# Patient Record
Sex: MALE | Race: BLACK OR AFRICAN AMERICAN | NOT HISPANIC OR LATINO | Employment: OTHER | URBAN - METROPOLITAN AREA
[De-identification: names, ages, dates, MRNs, and addresses within clinical notes are randomized per-mention and may not be internally consistent; named-entity substitution may affect disease eponyms.]

---

## 2018-08-30 ENCOUNTER — APPOINTMENT (OUTPATIENT)
Dept: LAB | Facility: HOSPITAL | Age: 23
End: 2018-08-30
Attending: ORTHOPAEDIC SURGERY
Payer: MEDICAID

## 2018-08-30 DIAGNOSIS — R30.0 DYSURIA: Primary | ICD-10-CM

## 2018-08-30 LAB
BACTERIA #/AREA URNS HPF: ABNORMAL /HPF
BILIRUB UR QL STRIP: NEGATIVE
CLARITY UR: CLEAR
COLOR UR: YELLOW
GLUCOSE UR QL STRIP: NEGATIVE
HGB UR QL STRIP: NEGATIVE
KETONES UR QL STRIP: NEGATIVE
LEUKOCYTE ESTERASE UR QL STRIP: ABNORMAL
MICROSCOPIC COMMENT: ABNORMAL
NITRITE UR QL STRIP: POSITIVE
PH UR STRIP: 6 [PH] (ref 5–8)
PROT UR QL STRIP: NEGATIVE
SP GR UR STRIP: 1.02 (ref 1–1.03)
URN SPEC COLLECT METH UR: ABNORMAL
UROBILINOGEN UR STRIP-ACNC: NEGATIVE EU/DL
WBC #/AREA URNS HPF: 38 /HPF (ref 0–5)

## 2018-08-30 PROCEDURE — 81000 URINALYSIS NONAUTO W/SCOPE: CPT

## 2018-08-30 PROCEDURE — 87088 URINE BACTERIA CULTURE: CPT

## 2018-08-30 PROCEDURE — 87186 SC STD MICRODIL/AGAR DIL: CPT

## 2018-08-30 PROCEDURE — 87086 URINE CULTURE/COLONY COUNT: CPT

## 2018-08-30 PROCEDURE — 87077 CULTURE AEROBIC IDENTIFY: CPT

## 2018-09-02 LAB — BACTERIA UR CULT: NORMAL

## 2018-09-22 ENCOUNTER — APPOINTMENT (OUTPATIENT)
Dept: LAB | Facility: HOSPITAL | Age: 23
End: 2018-09-22
Attending: FAMILY MEDICINE
Payer: MEDICAID

## 2018-09-22 DIAGNOSIS — R30.0 DYSURIA: Primary | ICD-10-CM

## 2018-09-22 LAB
BACTERIA #/AREA URNS HPF: ABNORMAL /HPF
BILIRUB UR QL STRIP: NEGATIVE
CLARITY UR: ABNORMAL
COLOR UR: YELLOW
GLUCOSE UR QL STRIP: NEGATIVE
HGB UR QL STRIP: ABNORMAL
KETONES UR QL STRIP: NEGATIVE
LEUKOCYTE ESTERASE UR QL STRIP: ABNORMAL
MICROSCOPIC COMMENT: ABNORMAL
NITRITE UR QL STRIP: NEGATIVE
PH UR STRIP: >8 [PH] (ref 5–8)
PROT UR QL STRIP: ABNORMAL
RBC #/AREA URNS HPF: 4 /HPF (ref 0–4)
SP GR UR STRIP: 1.01 (ref 1–1.03)
URN SPEC COLLECT METH UR: ABNORMAL
UROBILINOGEN UR STRIP-ACNC: NEGATIVE EU/DL
WBC #/AREA URNS HPF: 15 /HPF (ref 0–5)

## 2018-09-22 PROCEDURE — 87088 URINE BACTERIA CULTURE: CPT

## 2018-09-22 PROCEDURE — 87077 CULTURE AEROBIC IDENTIFY: CPT

## 2018-09-22 PROCEDURE — 87086 URINE CULTURE/COLONY COUNT: CPT

## 2018-09-22 PROCEDURE — 81000 URINALYSIS NONAUTO W/SCOPE: CPT

## 2018-09-22 PROCEDURE — 87186 SC STD MICRODIL/AGAR DIL: CPT

## 2018-09-25 LAB — BACTERIA UR CULT: NORMAL

## 2018-10-04 ENCOUNTER — APPOINTMENT (OUTPATIENT)
Dept: LAB | Facility: HOSPITAL | Age: 23
End: 2018-10-04
Attending: ORTHOPAEDIC SURGERY
Payer: MEDICAID

## 2018-10-04 DIAGNOSIS — N39.0 UTI (URINARY TRACT INFECTION): Primary | ICD-10-CM

## 2018-10-04 LAB
BACTERIA #/AREA URNS HPF: ABNORMAL /HPF
BILIRUB UR QL STRIP: NEGATIVE
CLARITY UR: ABNORMAL
COLOR UR: YELLOW
GLUCOSE UR QL STRIP: NEGATIVE
HGB UR QL STRIP: ABNORMAL
KETONES UR QL STRIP: NEGATIVE
LEUKOCYTE ESTERASE UR QL STRIP: ABNORMAL
MICROSCOPIC COMMENT: ABNORMAL
NITRITE UR QL STRIP: NEGATIVE
PH UR STRIP: 6 [PH] (ref 5–8)
PROT UR QL STRIP: NEGATIVE
RBC #/AREA URNS HPF: 75 /HPF (ref 0–4)
SP GR UR STRIP: >=1.03 (ref 1–1.03)
URN SPEC COLLECT METH UR: ABNORMAL
UROBILINOGEN UR STRIP-ACNC: NEGATIVE EU/DL
WBC #/AREA URNS HPF: 10 /HPF (ref 0–5)

## 2018-10-04 PROCEDURE — 87088 URINE BACTERIA CULTURE: CPT

## 2018-10-04 PROCEDURE — 87086 URINE CULTURE/COLONY COUNT: CPT

## 2018-10-04 PROCEDURE — 81000 URINALYSIS NONAUTO W/SCOPE: CPT

## 2018-10-04 PROCEDURE — 87077 CULTURE AEROBIC IDENTIFY: CPT

## 2018-10-04 PROCEDURE — 87186 SC STD MICRODIL/AGAR DIL: CPT

## 2018-10-06 LAB — BACTERIA UR CULT: NORMAL

## 2018-12-13 ENCOUNTER — TELEPHONE (OUTPATIENT)
Dept: SPORTS MEDICINE | Facility: CLINIC | Age: 23
End: 2018-12-13
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
## 2018-12-14 ENCOUNTER — APPOINTMENT (OUTPATIENT)
Dept: LAB | Facility: HOSPITAL | Age: 23
End: 2018-12-14
Attending: FAMILY MEDICINE
Payer: MEDICAID

## 2018-12-14 DIAGNOSIS — R30.0 DYSURIA: Primary | ICD-10-CM

## 2018-12-14 DIAGNOSIS — R31.9 HEMATURIA: ICD-10-CM

## 2018-12-14 LAB
BACTERIA #/AREA URNS HPF: ABNORMAL /HPF
MICROSCOPIC COMMENT: ABNORMAL
RBC #/AREA URNS HPF: >100 /HPF (ref 0–4)
SQUAMOUS #/AREA URNS HPF: 5 /HPF
WBC #/AREA URNS HPF: 15 /HPF (ref 0–5)

## 2018-12-14 PROCEDURE — 87088 URINE BACTERIA CULTURE: CPT

## 2018-12-14 PROCEDURE — 87186 SC STD MICRODIL/AGAR DIL: CPT

## 2018-12-14 PROCEDURE — 87077 CULTURE AEROBIC IDENTIFY: CPT

## 2018-12-14 PROCEDURE — 81000 URINALYSIS NONAUTO W/SCOPE: CPT

## 2018-12-14 PROCEDURE — 87086 URINE CULTURE/COLONY COUNT: CPT

## 2018-12-16 LAB — BACTERIA UR CULT: NORMAL

## 2019-01-31 ENCOUNTER — LAB VISIT (OUTPATIENT)
Dept: LAB | Facility: HOSPITAL | Age: 24
End: 2019-01-31
Attending: FAMILY MEDICINE
Payer: MEDICAID

## 2019-01-31 DIAGNOSIS — R30.0 DYSURIA: ICD-10-CM

## 2019-01-31 DIAGNOSIS — R31.9 HEMATURIA: Primary | ICD-10-CM

## 2019-01-31 LAB
BACTERIA #/AREA URNS HPF: ABNORMAL /HPF
BILIRUB UR QL STRIP: NEGATIVE
CLARITY UR: ABNORMAL
COLOR UR: ABNORMAL
GLUCOSE UR QL STRIP: NEGATIVE
HGB UR QL STRIP: ABNORMAL
HYALINE CASTS #/AREA URNS LPF: 0 /LPF
KETONES UR QL STRIP: ABNORMAL
LEUKOCYTE ESTERASE UR QL STRIP: ABNORMAL
MICROSCOPIC COMMENT: ABNORMAL
NITRITE UR QL STRIP: POSITIVE
PH UR STRIP: 7 [PH] (ref 5–8)
PROT UR QL STRIP: ABNORMAL
RBC #/AREA URNS HPF: >100 /HPF (ref 0–4)
SP GR UR STRIP: 1.02 (ref 1–1.03)
URN SPEC COLLECT METH UR: ABNORMAL
UROBILINOGEN UR STRIP-ACNC: ABNORMAL EU/DL
WBC #/AREA URNS HPF: 5 /HPF (ref 0–5)

## 2019-01-31 PROCEDURE — 87086 URINE CULTURE/COLONY COUNT: CPT

## 2019-01-31 PROCEDURE — 81000 URINALYSIS NONAUTO W/SCOPE: CPT

## 2019-02-01 LAB
BACTERIA UR CULT: NORMAL
BACTERIA UR CULT: NORMAL

## 2019-06-07 DIAGNOSIS — Z87.828 HX OF SPINAL CORD INJURY: Primary | ICD-10-CM

## 2019-09-16 ENCOUNTER — LAB VISIT (OUTPATIENT)
Dept: LAB | Facility: HOSPITAL | Age: 24
End: 2019-09-16
Attending: FAMILY MEDICINE
Payer: MEDICAID

## 2019-09-16 DIAGNOSIS — R41.82 CHANGE IN MENTAL STATE: Primary | ICD-10-CM

## 2019-09-16 LAB
ANION GAP SERPL CALC-SCNC: 10 MMOL/L (ref 8–16)
BACTERIA #/AREA URNS HPF: ABNORMAL /HPF
BASOPHILS # BLD AUTO: 0.06 K/UL (ref 0–0.2)
BASOPHILS NFR BLD: 0.4 % (ref 0–1.9)
BILIRUB UR QL STRIP: NEGATIVE
BUN SERPL-MCNC: 8 MG/DL (ref 6–20)
CALCIUM SERPL-MCNC: 9.7 MG/DL (ref 8.7–10.5)
CHLORIDE SERPL-SCNC: 103 MMOL/L (ref 95–110)
CLARITY UR: ABNORMAL
CO2 SERPL-SCNC: 26 MMOL/L (ref 23–29)
COLOR UR: YELLOW
CREAT SERPL-MCNC: 0.8 MG/DL (ref 0.5–1.4)
DIFFERENTIAL METHOD: ABNORMAL
EOSINOPHIL # BLD AUTO: 0.3 K/UL (ref 0–0.5)
EOSINOPHIL NFR BLD: 1.7 % (ref 0–8)
ERYTHROCYTE [DISTWIDTH] IN BLOOD BY AUTOMATED COUNT: 19.6 % (ref 11.5–14.5)
EST. GFR  (AFRICAN AMERICAN): >60 ML/MIN/1.73 M^2
EST. GFR  (NON AFRICAN AMERICAN): >60 ML/MIN/1.73 M^2
GLUCOSE SERPL-MCNC: 73 MG/DL (ref 70–110)
GLUCOSE UR QL STRIP: NEGATIVE
HCT VFR BLD AUTO: 45.5 % (ref 40–54)
HGB BLD-MCNC: 14.1 G/DL (ref 14–18)
HGB UR QL STRIP: NEGATIVE
HYALINE CASTS #/AREA URNS LPF: 0 /LPF
IMM GRANULOCYTES # BLD AUTO: 0.08 K/UL (ref 0–0.04)
KETONES UR QL STRIP: NEGATIVE
LEUKOCYTE ESTERASE UR QL STRIP: ABNORMAL
LYMPHOCYTES # BLD AUTO: 2.3 K/UL (ref 1–4.8)
LYMPHOCYTES NFR BLD: 15.9 % (ref 18–48)
MCH RBC QN AUTO: 24.6 PG (ref 27–31)
MCHC RBC AUTO-ENTMCNC: 31 G/DL (ref 32–36)
MCV RBC AUTO: 79 FL (ref 82–98)
MICROSCOPIC COMMENT: ABNORMAL
MONOCYTES # BLD AUTO: 0.8 K/UL (ref 0.3–1)
MONOCYTES NFR BLD: 5.6 % (ref 4–15)
NEUTROPHILS # BLD AUTO: 11 K/UL (ref 1.8–7.7)
NEUTROPHILS NFR BLD: 75.8 % (ref 38–73)
NITRITE UR QL STRIP: NEGATIVE
NRBC BLD-RTO: 0 /100 WBC
PH UR STRIP: >8 [PH] (ref 5–8)
PLATELET # BLD AUTO: 212 K/UL (ref 150–350)
PMV BLD AUTO: ABNORMAL FL (ref 9.2–12.9)
POTASSIUM SERPL-SCNC: 4.9 MMOL/L (ref 3.5–5.1)
PROT UR QL STRIP: ABNORMAL
RBC # BLD AUTO: 5.74 M/UL (ref 4.6–6.2)
RBC #/AREA URNS HPF: 0 /HPF (ref 0–4)
SODIUM SERPL-SCNC: 139 MMOL/L (ref 136–145)
SP GR UR STRIP: 1.01 (ref 1–1.03)
SQUAMOUS #/AREA URNS HPF: 1 /HPF
TRI-PHOS CRY URNS QL MICRO: ABNORMAL
URN SPEC COLLECT METH UR: ABNORMAL
UROBILINOGEN UR STRIP-ACNC: NEGATIVE EU/DL
WBC # BLD AUTO: 14.52 K/UL (ref 3.9–12.7)
WBC #/AREA URNS HPF: 75 /HPF (ref 0–5)

## 2019-09-16 PROCEDURE — 36415 COLL VENOUS BLD VENIPUNCTURE: CPT

## 2019-09-16 PROCEDURE — 81000 URINALYSIS NONAUTO W/SCOPE: CPT

## 2019-09-16 PROCEDURE — 87086 URINE CULTURE/COLONY COUNT: CPT

## 2019-09-16 PROCEDURE — 85025 COMPLETE CBC W/AUTO DIFF WBC: CPT

## 2019-09-16 PROCEDURE — 87088 URINE BACTERIA CULTURE: CPT

## 2019-09-16 PROCEDURE — 80048 BASIC METABOLIC PNL TOTAL CA: CPT

## 2019-09-16 PROCEDURE — 87186 SC STD MICRODIL/AGAR DIL: CPT

## 2019-09-16 PROCEDURE — 87077 CULTURE AEROBIC IDENTIFY: CPT

## 2019-09-19 LAB — BACTERIA UR CULT: ABNORMAL

## 2019-11-12 ENCOUNTER — HOSPITAL ENCOUNTER (EMERGENCY)
Facility: HOSPITAL | Age: 24
Discharge: HOME | End: 2019-11-12
Attending: EMERGENCY MEDICINE
Payer: MEDICAID

## 2019-11-12 VITALS
SYSTOLIC BLOOD PRESSURE: 136 MMHG | OXYGEN SATURATION: 100 % | HEIGHT: 68 IN | TEMPERATURE: 99 F | RESPIRATION RATE: 18 BRPM | DIASTOLIC BLOOD PRESSURE: 81 MMHG | BODY MASS INDEX: 32.13 KG/M2 | HEART RATE: 110 BPM | WEIGHT: 212 LBS

## 2019-11-12 DIAGNOSIS — Z00.8 ENCOUNTER FOR MEDICAL CLEARANCE FOR PATIENT HOLD: Primary | ICD-10-CM

## 2019-11-12 LAB
ALBUMIN SERPL BCP-MCNC: 3.9 G/DL (ref 3.5–5.2)
ALP SERPL-CCNC: 115 U/L (ref 55–135)
ALT SERPL W/O P-5'-P-CCNC: 33 U/L (ref 10–44)
AMPHET+METHAMPHET UR QL: NEGATIVE
ANION GAP SERPL CALC-SCNC: 9 MMOL/L (ref 8–16)
APAP SERPL-MCNC: <10 UG/ML (ref 10–20)
AST SERPL-CCNC: 23 U/L (ref 10–40)
BARBITURATES UR QL SCN>200 NG/ML: NEGATIVE
BASOPHILS # BLD AUTO: 0.05 K/UL (ref 0–0.2)
BASOPHILS NFR BLD: 0.6 % (ref 0–1.9)
BENZODIAZ UR QL SCN>200 NG/ML: NEGATIVE
BILIRUB SERPL-MCNC: 0.4 MG/DL (ref 0.1–1)
BILIRUB UR QL STRIP: NEGATIVE
BUN SERPL-MCNC: 9 MG/DL (ref 6–20)
BZE UR QL SCN: NEGATIVE
CALCIUM SERPL-MCNC: 9.2 MG/DL (ref 8.7–10.5)
CANNABINOIDS UR QL SCN: NEGATIVE
CHLORIDE SERPL-SCNC: 101 MMOL/L (ref 95–110)
CLARITY UR: CLEAR
CO2 SERPL-SCNC: 27 MMOL/L (ref 23–29)
COLOR UR: COLORLESS
CREAT SERPL-MCNC: 0.9 MG/DL (ref 0.5–1.4)
CREAT UR-MCNC: 20 MG/DL (ref 23–375)
DIFFERENTIAL METHOD: ABNORMAL
EOSINOPHIL # BLD AUTO: 0.3 K/UL (ref 0–0.5)
EOSINOPHIL NFR BLD: 3.3 % (ref 0–8)
ERYTHROCYTE [DISTWIDTH] IN BLOOD BY AUTOMATED COUNT: 16.5 % (ref 11.5–14.5)
EST. GFR  (AFRICAN AMERICAN): >60 ML/MIN/1.73 M^2
EST. GFR  (NON AFRICAN AMERICAN): >60 ML/MIN/1.73 M^2
ETHANOL SERPL-MCNC: <5 MG/DL
GLUCOSE SERPL-MCNC: 82 MG/DL (ref 70–110)
GLUCOSE UR QL STRIP: NEGATIVE
HCT VFR BLD AUTO: 44 % (ref 40–54)
HGB BLD-MCNC: 14.1 G/DL (ref 14–18)
HGB UR QL STRIP: NEGATIVE
IMM GRANULOCYTES # BLD AUTO: 0.04 K/UL (ref 0–0.04)
IMM GRANULOCYTES NFR BLD AUTO: 0.5 % (ref 0–0.5)
KETONES UR QL STRIP: NEGATIVE
LEUKOCYTE ESTERASE UR QL STRIP: NEGATIVE
LYMPHOCYTES # BLD AUTO: 1.9 K/UL (ref 1–4.8)
LYMPHOCYTES NFR BLD: 22.7 % (ref 18–48)
MCH RBC QN AUTO: 26.1 PG (ref 27–31)
MCHC RBC AUTO-ENTMCNC: 32 G/DL (ref 32–36)
MCV RBC AUTO: 81 FL (ref 82–98)
MONOCYTES # BLD AUTO: 0.5 K/UL (ref 0.3–1)
MONOCYTES NFR BLD: 6 % (ref 4–15)
NEUTROPHILS # BLD AUTO: 5.5 K/UL (ref 1.8–7.7)
NEUTROPHILS NFR BLD: 66.9 % (ref 38–73)
NITRITE UR QL STRIP: NEGATIVE
NRBC BLD-RTO: 0 /100 WBC
OPIATES UR QL SCN: NEGATIVE
PCP UR QL SCN>25 NG/ML: NEGATIVE
PH UR STRIP: 8 [PH] (ref 5–8)
PLATELET # BLD AUTO: 180 K/UL (ref 150–350)
PMV BLD AUTO: 11.8 FL (ref 9.2–12.9)
POTASSIUM SERPL-SCNC: 4.1 MMOL/L (ref 3.5–5.1)
PROT SERPL-MCNC: 7.7 G/DL (ref 6–8.4)
PROT UR QL STRIP: NEGATIVE
RBC # BLD AUTO: 5.41 M/UL (ref 4.6–6.2)
SODIUM SERPL-SCNC: 137 MMOL/L (ref 136–145)
SP GR UR STRIP: 1 (ref 1–1.03)
TOXICOLOGY INFORMATION: ABNORMAL
TSH SERPL DL<=0.005 MIU/L-ACNC: 2.32 UIU/ML (ref 0.34–5.6)
URN SPEC COLLECT METH UR: ABNORMAL
UROBILINOGEN UR STRIP-ACNC: NEGATIVE EU/DL
WBC # BLD AUTO: 8.27 K/UL (ref 3.9–12.7)

## 2019-11-12 PROCEDURE — 85025 COMPLETE CBC W/AUTO DIFF WBC: CPT

## 2019-11-12 PROCEDURE — 84443 ASSAY THYROID STIM HORMONE: CPT

## 2019-11-12 PROCEDURE — 80307 DRUG TEST PRSMV CHEM ANLYZR: CPT

## 2019-11-12 PROCEDURE — 99283 PR EMERGENCY DEPT VISIT,LEVEL III: ICD-10-PCS | Mod: 95,,, | Performed by: PSYCHIATRY & NEUROLOGY

## 2019-11-12 PROCEDURE — 80320 DRUG SCREEN QUANTALCOHOLS: CPT

## 2019-11-12 PROCEDURE — 81003 URINALYSIS AUTO W/O SCOPE: CPT

## 2019-11-12 PROCEDURE — 80053 COMPREHEN METABOLIC PANEL: CPT

## 2019-11-12 PROCEDURE — 99283 EMERGENCY DEPT VISIT LOW MDM: CPT

## 2019-11-12 PROCEDURE — 99283 EMERGENCY DEPT VISIT LOW MDM: CPT | Mod: 95,,, | Performed by: PSYCHIATRY & NEUROLOGY

## 2019-11-12 RX ORDER — LANOLIN ALCOHOL/MO/W.PET/CERES
400 CREAM (GRAM) TOPICAL DAILY
COMMUNITY

## 2019-11-12 RX ORDER — POLYETHYLENE GLYCOL 3350 17 G/17G
17 POWDER, FOR SOLUTION ORAL DAILY
COMMUNITY

## 2019-11-12 RX ORDER — ACETAMINOPHEN 325 MG/1
650 TABLET ORAL EVERY 6 HOURS PRN
COMMUNITY

## 2019-11-12 RX ORDER — ACETAMINOPHEN 500 MG
2 TABLET ORAL NIGHTLY PRN
COMMUNITY

## 2019-11-12 RX ORDER — FAMOTIDINE 20 MG/1
20 TABLET, FILM COATED ORAL 2 TIMES DAILY
COMMUNITY

## 2019-11-12 RX ORDER — DULOXETIN HYDROCHLORIDE 60 MG/1
60 CAPSULE, DELAYED RELEASE ORAL 2 TIMES DAILY
COMMUNITY

## 2019-11-12 RX ORDER — AMITRIPTYLINE HYDROCHLORIDE 100 MG/1
100 TABLET ORAL 3 TIMES DAILY
COMMUNITY

## 2019-11-12 RX ORDER — CLONIDINE HYDROCHLORIDE 0.1 MG/1
0.1 TABLET ORAL 4 TIMES DAILY PRN
COMMUNITY

## 2019-11-12 RX ORDER — GABAPENTIN 600 MG/1
1200 TABLET ORAL 3 TIMES DAILY
COMMUNITY

## 2019-11-12 RX ORDER — BISACODYL 5 MG
10 TABLET, DELAYED RELEASE (ENTERIC COATED) ORAL NIGHTLY PRN
COMMUNITY

## 2019-11-12 RX ORDER — OXYBUTYNIN CHLORIDE 15 MG/1
15 TABLET, EXTENDED RELEASE ORAL DAILY
COMMUNITY

## 2019-11-12 RX ORDER — ENALAPRIL MALEATE 5 MG/1
5 TABLET ORAL DAILY
COMMUNITY

## 2019-11-12 RX ORDER — BACLOFEN 20 MG/1
20 TABLET ORAL 4 TIMES DAILY
COMMUNITY

## 2019-11-12 NOTE — DISCHARGE INSTRUCTIONS
Drink lots of fluids.  Rest.  Follow up with psychiatrist as outpatient to have you medication adjusted as medication could be making you more sleepy.  You must follow up with her primary care provider.
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
## 2019-11-16 ENCOUNTER — APPOINTMENT (OUTPATIENT)
Dept: LAB | Facility: HOSPITAL | Age: 24
End: 2019-11-16
Attending: ORTHOPAEDIC SURGERY
Payer: MEDICAID

## 2019-11-16 DIAGNOSIS — R30.0 DYSURIA: Primary | ICD-10-CM

## 2019-11-16 LAB
BILIRUB UR QL STRIP: NEGATIVE
CLARITY UR: CLEAR
COLOR UR: YELLOW
GLUCOSE UR QL STRIP: NEGATIVE
HGB UR QL STRIP: NEGATIVE
KETONES UR QL STRIP: NEGATIVE
LEUKOCYTE ESTERASE UR QL STRIP: NEGATIVE
NITRITE UR QL STRIP: NEGATIVE
PH UR STRIP: 6 [PH] (ref 5–8)
PROT UR QL STRIP: NEGATIVE
SP GR UR STRIP: >=1.03 (ref 1–1.03)
URN SPEC COLLECT METH UR: ABNORMAL
UROBILINOGEN UR STRIP-ACNC: NEGATIVE EU/DL

## 2019-11-16 PROCEDURE — 87086 URINE CULTURE/COLONY COUNT: CPT

## 2019-11-16 PROCEDURE — 81003 URINALYSIS AUTO W/O SCOPE: CPT

## 2019-11-18 LAB — BACTERIA UR CULT: NO GROWTH

## 2019-12-16 ENCOUNTER — APPOINTMENT (OUTPATIENT)
Dept: LAB | Facility: HOSPITAL | Age: 24
End: 2019-12-16
Attending: FAMILY MEDICINE
Payer: MEDICAID

## 2019-12-16 DIAGNOSIS — D72.829 LEUKOCYTOSIS: Primary | ICD-10-CM

## 2019-12-16 LAB
BACTERIA #/AREA URNS HPF: ABNORMAL /HPF
BILIRUB UR QL STRIP: NEGATIVE
CLARITY UR: CLEAR
COLOR UR: YELLOW
GLUCOSE UR QL STRIP: NEGATIVE
HGB UR QL STRIP: NEGATIVE
KETONES UR QL STRIP: NEGATIVE
LEUKOCYTE ESTERASE UR QL STRIP: ABNORMAL
MICROSCOPIC COMMENT: ABNORMAL
NITRITE UR QL STRIP: POSITIVE
PH UR STRIP: 6 [PH] (ref 5–8)
PROT UR QL STRIP: NEGATIVE
SP GR UR STRIP: 1.01 (ref 1–1.03)
SQUAMOUS #/AREA URNS HPF: 1 /HPF
URN SPEC COLLECT METH UR: ABNORMAL
UROBILINOGEN UR STRIP-ACNC: NEGATIVE EU/DL
WBC #/AREA URNS HPF: 25 /HPF (ref 0–5)

## 2019-12-16 PROCEDURE — 87186 SC STD MICRODIL/AGAR DIL: CPT

## 2019-12-16 PROCEDURE — 81000 URINALYSIS NONAUTO W/SCOPE: CPT

## 2019-12-16 PROCEDURE — 87077 CULTURE AEROBIC IDENTIFY: CPT

## 2019-12-16 PROCEDURE — 87088 URINE BACTERIA CULTURE: CPT

## 2019-12-16 PROCEDURE — 87086 URINE CULTURE/COLONY COUNT: CPT

## 2019-12-19 LAB — BACTERIA UR CULT: ABNORMAL

## 2020-01-17 ENCOUNTER — APPOINTMENT (OUTPATIENT)
Dept: LAB | Facility: HOSPITAL | Age: 25
End: 2020-01-17
Attending: FAMILY MEDICINE
Payer: MEDICAID

## 2020-01-17 DIAGNOSIS — N31.8 HYPERTONICITY OF BLADDER: ICD-10-CM

## 2020-01-17 DIAGNOSIS — S24.104S: ICD-10-CM

## 2020-01-17 DIAGNOSIS — R82.90 ABNORMAL URINE FINDINGS: Primary | ICD-10-CM

## 2020-01-17 DIAGNOSIS — I95.89 CHRONIC HYPOTENSION: ICD-10-CM

## 2020-01-17 DIAGNOSIS — F33.9 RECURRENT MAJOR DEPRESSION: ICD-10-CM

## 2020-01-17 DIAGNOSIS — G82.21 COMPLETE PARAPLEGIA: ICD-10-CM

## 2020-01-17 LAB
BACTERIA #/AREA URNS HPF: ABNORMAL /HPF
BILIRUB UR QL STRIP: NEGATIVE
CLARITY UR: ABNORMAL
COLOR UR: YELLOW
GLUCOSE UR QL STRIP: NEGATIVE
HGB UR QL STRIP: ABNORMAL
KETONES UR QL STRIP: NEGATIVE
LEUKOCYTE ESTERASE UR QL STRIP: ABNORMAL
MICROSCOPIC COMMENT: ABNORMAL
NITRITE UR QL STRIP: NEGATIVE
PH UR STRIP: 6 [PH] (ref 5–8)
PROT UR QL STRIP: NEGATIVE
RBC #/AREA URNS HPF: 5 /HPF (ref 0–4)
SP GR UR STRIP: 1.01 (ref 1–1.03)
SQUAMOUS #/AREA URNS HPF: 7 /HPF
URATE CRY URNS QL MICRO: ABNORMAL
URN SPEC COLLECT METH UR: ABNORMAL
UROBILINOGEN UR STRIP-ACNC: NEGATIVE EU/DL
WBC #/AREA URNS HPF: >100 /HPF (ref 0–5)

## 2020-01-17 PROCEDURE — 87086 URINE CULTURE/COLONY COUNT: CPT

## 2020-01-17 PROCEDURE — 81000 URINALYSIS NONAUTO W/SCOPE: CPT

## 2020-01-19 LAB
BACTERIA UR CULT: NORMAL
BACTERIA UR CULT: NORMAL

## 2020-01-25 ENCOUNTER — LAB VISIT (OUTPATIENT)
Dept: LAB | Facility: HOSPITAL | Age: 25
End: 2020-01-25
Attending: FAMILY MEDICINE
Payer: MEDICAID

## 2020-01-25 DIAGNOSIS — N39.0 UTI (URINARY TRACT INFECTION): ICD-10-CM

## 2020-01-25 DIAGNOSIS — S91.301A WOUND OF RIGHT FOOT: Primary | ICD-10-CM

## 2020-01-25 LAB
BASOPHILS # BLD AUTO: 0.05 K/UL (ref 0–0.2)
BASOPHILS NFR BLD: 0.5 % (ref 0–1.9)
DIFFERENTIAL METHOD: ABNORMAL
EOSINOPHIL # BLD AUTO: 0.3 K/UL (ref 0–0.5)
EOSINOPHIL NFR BLD: 2.3 % (ref 0–8)
ERYTHROCYTE [DISTWIDTH] IN BLOOD BY AUTOMATED COUNT: 14.4 % (ref 11.5–14.5)
HCT VFR BLD AUTO: 39.1 % (ref 40–54)
HGB BLD-MCNC: 12.4 G/DL (ref 14–18)
IMM GRANULOCYTES # BLD AUTO: 0.12 K/UL (ref 0–0.04)
LYMPHOCYTES # BLD AUTO: 2.2 K/UL (ref 1–4.8)
LYMPHOCYTES NFR BLD: 20.2 % (ref 18–48)
MCH RBC QN AUTO: 26.2 PG (ref 27–31)
MCHC RBC AUTO-ENTMCNC: 31.7 G/DL (ref 32–36)
MCV RBC AUTO: 83 FL (ref 82–98)
MONOCYTES # BLD AUTO: 0.9 K/UL (ref 0.3–1)
MONOCYTES NFR BLD: 8 % (ref 4–15)
NEUTROPHILS # BLD AUTO: 7.5 K/UL (ref 1.8–7.7)
NEUTROPHILS NFR BLD: 67.9 % (ref 38–73)
NRBC BLD-RTO: 0 /100 WBC
PLATELET # BLD AUTO: 244 K/UL (ref 150–350)
PMV BLD AUTO: 11.1 FL (ref 9.2–12.9)
RBC # BLD AUTO: 4.73 M/UL (ref 4.6–6.2)
WBC # BLD AUTO: 11.06 K/UL (ref 3.9–12.7)

## 2020-01-25 PROCEDURE — 85025 COMPLETE CBC W/AUTO DIFF WBC: CPT

## 2020-01-25 PROCEDURE — 36415 COLL VENOUS BLD VENIPUNCTURE: CPT

## 2020-02-18 ENCOUNTER — HOSPITAL ENCOUNTER (OUTPATIENT)
Facility: HOSPITAL | Age: 25
Discharge: NURSING, REHAB OR LONG-TERM CARE | End: 2020-02-21
Attending: EMERGENCY MEDICINE | Admitting: INTERNAL MEDICINE
Payer: MEDICAID

## 2020-02-18 DIAGNOSIS — L89.90 PRESSURE INJURY OF SKIN, UNSPECIFIED INJURY STAGE, UNSPECIFIED LOCATION: Primary | ICD-10-CM

## 2020-02-18 DIAGNOSIS — R60.0 BILATERAL LOWER EXTREMITY EDEMA: ICD-10-CM

## 2020-02-18 DIAGNOSIS — R07.9 CHEST PAIN: ICD-10-CM

## 2020-02-18 DIAGNOSIS — R60.9 SWELLING: ICD-10-CM

## 2020-02-18 DIAGNOSIS — L97.502 ULCER OF FOOT WITH FAT LAYER EXPOSED, UNSPECIFIED LATERALITY: ICD-10-CM

## 2020-02-18 PROBLEM — L97.522 FOOT ULCER, LEFT, WITH FAT LAYER EXPOSED: Status: ACTIVE | Noted: 2020-02-18

## 2020-02-18 PROBLEM — L97.512 FOOT ULCER, RIGHT, WITH FAT LAYER EXPOSED: Status: ACTIVE | Noted: 2020-02-18

## 2020-02-18 LAB
ALBUMIN SERPL BCP-MCNC: 3.9 G/DL (ref 3.5–5.2)
ALP SERPL-CCNC: 110 U/L (ref 55–135)
ALT SERPL W/O P-5'-P-CCNC: 44 U/L (ref 10–44)
AMPHET+METHAMPHET UR QL: NEGATIVE
ANION GAP SERPL CALC-SCNC: 9 MMOL/L (ref 8–16)
APTT PPP: 39.4 SEC (ref 23.6–33.3)
AST SERPL-CCNC: 24 U/L (ref 10–40)
BARBITURATES UR QL SCN>200 NG/ML: NEGATIVE
BASOPHILS # BLD AUTO: 0.06 K/UL (ref 0–0.2)
BASOPHILS NFR BLD: 0.7 % (ref 0–1.9)
BENZODIAZ UR QL SCN>200 NG/ML: NEGATIVE
BILIRUB SERPL-MCNC: 0.6 MG/DL (ref 0.1–1)
BILIRUB UR QL STRIP: NEGATIVE
BUN SERPL-MCNC: 10 MG/DL (ref 6–20)
BZE UR QL SCN: NEGATIVE
CALCIUM SERPL-MCNC: 9.4 MG/DL (ref 8.7–10.5)
CANNABINOIDS UR QL SCN: NEGATIVE
CHLORIDE SERPL-SCNC: 100 MMOL/L (ref 95–110)
CLARITY UR: CLEAR
CO2 SERPL-SCNC: 29 MMOL/L (ref 23–29)
COLOR UR: YELLOW
CREAT SERPL-MCNC: 0.8 MG/DL (ref 0.5–1.4)
CREAT UR-MCNC: 58 MG/DL (ref 23–375)
CRP SERPL-MCNC: 4.23 MG/DL (ref 0–0.75)
DIFFERENTIAL METHOD: ABNORMAL
EOSINOPHIL # BLD AUTO: 0.2 K/UL (ref 0–0.5)
EOSINOPHIL NFR BLD: 2.6 % (ref 0–8)
ERYTHROCYTE [DISTWIDTH] IN BLOOD BY AUTOMATED COUNT: 15.5 % (ref 11.5–14.5)
ERYTHROCYTE [SEDIMENTATION RATE] IN BLOOD BY WESTERGREN METHOD: 13 MM/HR (ref 0–10)
EST. GFR  (AFRICAN AMERICAN): >60 ML/MIN/1.73 M^2
EST. GFR  (NON AFRICAN AMERICAN): >60 ML/MIN/1.73 M^2
GLUCOSE SERPL-MCNC: 80 MG/DL (ref 70–110)
GLUCOSE UR QL STRIP: NEGATIVE
HCT VFR BLD AUTO: 42.2 % (ref 40–54)
HGB BLD-MCNC: 13.4 G/DL (ref 14–18)
HGB UR QL STRIP: NEGATIVE
IMM GRANULOCYTES # BLD AUTO: 0.05 K/UL (ref 0–0.04)
IMM GRANULOCYTES NFR BLD AUTO: 0.6 % (ref 0–0.5)
INR PPP: 1.2
KETONES UR QL STRIP: NEGATIVE
LACTATE SERPL-SCNC: 0.8 MMOL/L (ref 0.5–1.9)
LEUKOCYTE ESTERASE UR QL STRIP: NEGATIVE
LYMPHOCYTES # BLD AUTO: 2 K/UL (ref 1–4.8)
LYMPHOCYTES NFR BLD: 22.7 % (ref 18–48)
MCH RBC QN AUTO: 26.4 PG (ref 27–31)
MCHC RBC AUTO-ENTMCNC: 31.8 G/DL (ref 32–36)
MCV RBC AUTO: 83 FL (ref 82–98)
MONOCYTES # BLD AUTO: 0.5 K/UL (ref 0.3–1)
MONOCYTES NFR BLD: 6 % (ref 4–15)
NEUTROPHILS # BLD AUTO: 6 K/UL (ref 1.8–7.7)
NEUTROPHILS NFR BLD: 67.4 % (ref 38–73)
NITRITE UR QL STRIP: NEGATIVE
NRBC BLD-RTO: 0 /100 WBC
OPIATES UR QL SCN: NEGATIVE
PCP UR QL SCN>25 NG/ML: NEGATIVE
PH UR STRIP: 7 [PH] (ref 5–8)
PLATELET # BLD AUTO: 227 K/UL (ref 150–350)
PMV BLD AUTO: 12 FL (ref 9.2–12.9)
POTASSIUM SERPL-SCNC: 3.9 MMOL/L (ref 3.5–5.1)
PROT SERPL-MCNC: 7.8 G/DL (ref 6–8.4)
PROT UR QL STRIP: NEGATIVE
PROTHROMBIN TIME: 15 SEC (ref 10.6–14.8)
RBC # BLD AUTO: 5.07 M/UL (ref 4.6–6.2)
SODIUM SERPL-SCNC: 138 MMOL/L (ref 136–145)
SP GR UR STRIP: 1.01 (ref 1–1.03)
TOXICOLOGY INFORMATION: NORMAL
URN SPEC COLLECT METH UR: NORMAL
UROBILINOGEN UR STRIP-ACNC: NEGATIVE EU/DL
WBC # BLD AUTO: 8.82 K/UL (ref 3.9–12.7)

## 2020-02-18 PROCEDURE — 86140 C-REACTIVE PROTEIN: CPT

## 2020-02-18 PROCEDURE — 85651 RBC SED RATE NONAUTOMATED: CPT

## 2020-02-18 PROCEDURE — 83605 ASSAY OF LACTIC ACID: CPT

## 2020-02-18 PROCEDURE — 63600175 PHARM REV CODE 636 W HCPCS: Performed by: EMERGENCY MEDICINE

## 2020-02-18 PROCEDURE — 85025 COMPLETE CBC W/AUTO DIFF WBC: CPT

## 2020-02-18 PROCEDURE — 99285 EMERGENCY DEPT VISIT HI MDM: CPT | Mod: 25

## 2020-02-18 PROCEDURE — 87040 BLOOD CULTURE FOR BACTERIA: CPT

## 2020-02-18 PROCEDURE — G0378 HOSPITAL OBSERVATION PER HR: HCPCS

## 2020-02-18 PROCEDURE — 80053 COMPREHEN METABOLIC PANEL: CPT

## 2020-02-18 PROCEDURE — 36415 COLL VENOUS BLD VENIPUNCTURE: CPT

## 2020-02-18 PROCEDURE — 85610 PROTHROMBIN TIME: CPT

## 2020-02-18 PROCEDURE — 81003 URINALYSIS AUTO W/O SCOPE: CPT

## 2020-02-18 PROCEDURE — 96376 TX/PRO/DX INJ SAME DRUG ADON: CPT

## 2020-02-18 PROCEDURE — 80307 DRUG TEST PRSMV CHEM ANLYZR: CPT

## 2020-02-18 PROCEDURE — 85730 THROMBOPLASTIN TIME PARTIAL: CPT

## 2020-02-18 RX ORDER — VANCOMYCIN HCL IN 5 % DEXTROSE 1G/250ML
1000 PLASTIC BAG, INJECTION (ML) INTRAVENOUS ONCE
Status: COMPLETED | OUTPATIENT
Start: 2020-02-18 | End: 2020-02-18

## 2020-02-18 RX ORDER — CALCIUM CARBONATE 200(500)MG
2 TABLET,CHEWABLE ORAL 4 TIMES DAILY PRN
COMMUNITY

## 2020-02-18 RX ORDER — FUROSEMIDE 40 MG/1
40 TABLET ORAL DAILY
COMMUNITY

## 2020-02-18 RX ORDER — MUPIROCIN 20 MG/G
1 OINTMENT TOPICAL NIGHTLY
COMMUNITY

## 2020-02-18 RX ADMIN — VANCOMYCIN HYDROCHLORIDE 1000 MG: 1 INJECTION, POWDER, LYOPHILIZED, FOR SOLUTION INTRAVENOUS at 09:02

## 2020-02-18 RX ADMIN — VANCOMYCIN HYDROCHLORIDE 500 MG: 500 INJECTION, POWDER, LYOPHILIZED, FOR SOLUTION INTRAVENOUS at 09:02

## 2020-02-19 ENCOUNTER — ANESTHESIA EVENT (OUTPATIENT)
Dept: SURGERY | Facility: HOSPITAL | Age: 25
End: 2020-02-19
Payer: MEDICAID

## 2020-02-19 ENCOUNTER — ANESTHESIA (OUTPATIENT)
Dept: SURGERY | Facility: HOSPITAL | Age: 25
End: 2020-02-19
Payer: MEDICAID

## 2020-02-19 LAB
ALBUMIN SERPL BCP-MCNC: 3.5 G/DL (ref 3.5–5.2)
ALP SERPL-CCNC: 100 U/L (ref 55–135)
ALT SERPL W/O P-5'-P-CCNC: 37 U/L (ref 10–44)
ANION GAP SERPL CALC-SCNC: 10 MMOL/L (ref 8–16)
AST SERPL-CCNC: 22 U/L (ref 10–40)
BASOPHILS # BLD AUTO: 0.05 K/UL (ref 0–0.2)
BASOPHILS NFR BLD: 0.6 % (ref 0–1.9)
BILIRUB SERPL-MCNC: 0.6 MG/DL (ref 0.1–1)
BUN SERPL-MCNC: 10 MG/DL (ref 6–20)
CALCIUM SERPL-MCNC: 9.1 MG/DL (ref 8.7–10.5)
CHLORIDE SERPL-SCNC: 101 MMOL/L (ref 95–110)
CHOLEST SERPL-MCNC: 149 MG/DL (ref 120–199)
CHOLEST/HDLC SERPL: 3.5 {RATIO} (ref 2–5)
CO2 SERPL-SCNC: 28 MMOL/L (ref 23–29)
CREAT SERPL-MCNC: 0.7 MG/DL (ref 0.5–1.4)
DIFFERENTIAL METHOD: ABNORMAL
EOSINOPHIL # BLD AUTO: 0.3 K/UL (ref 0–0.5)
EOSINOPHIL NFR BLD: 3.4 % (ref 0–8)
ERYTHROCYTE [DISTWIDTH] IN BLOOD BY AUTOMATED COUNT: 15.4 % (ref 11.5–14.5)
EST. GFR  (AFRICAN AMERICAN): >60 ML/MIN/1.73 M^2
EST. GFR  (NON AFRICAN AMERICAN): >60 ML/MIN/1.73 M^2
ESTIMATED AVG GLUCOSE: 108 MG/DL (ref 68–131)
GLUCOSE SERPL-MCNC: 84 MG/DL (ref 70–110)
HBA1C MFR BLD HPLC: 5.4 % (ref 4.5–6.2)
HCT VFR BLD AUTO: 39.9 % (ref 40–54)
HDLC SERPL-MCNC: 43 MG/DL (ref 40–75)
HDLC SERPL: 28.9 % (ref 20–50)
HGB BLD-MCNC: 12.4 G/DL (ref 14–18)
IMM GRANULOCYTES # BLD AUTO: 0.06 K/UL (ref 0–0.04)
IMM GRANULOCYTES NFR BLD AUTO: 0.8 % (ref 0–0.5)
LDLC SERPL CALC-MCNC: 91.4 MG/DL (ref 63–159)
LYMPHOCYTES # BLD AUTO: 1.6 K/UL (ref 1–4.8)
LYMPHOCYTES NFR BLD: 21 % (ref 18–48)
MAGNESIUM SERPL-MCNC: 1.9 MG/DL (ref 1.6–2.6)
MCH RBC QN AUTO: 26.1 PG (ref 27–31)
MCHC RBC AUTO-ENTMCNC: 31.1 G/DL (ref 32–36)
MCV RBC AUTO: 84 FL (ref 82–98)
MONOCYTES # BLD AUTO: 0.8 K/UL (ref 0.3–1)
MONOCYTES NFR BLD: 9.7 % (ref 4–15)
NEUTROPHILS # BLD AUTO: 5 K/UL (ref 1.8–7.7)
NEUTROPHILS NFR BLD: 64.5 % (ref 38–73)
NONHDLC SERPL-MCNC: 106 MG/DL
NRBC BLD-RTO: 0 /100 WBC
PLATELET # BLD AUTO: 204 K/UL (ref 150–350)
PMV BLD AUTO: 12.1 FL (ref 9.2–12.9)
POTASSIUM SERPL-SCNC: 3.8 MMOL/L (ref 3.5–5.1)
PROT SERPL-MCNC: 7.1 G/DL (ref 6–8.4)
RBC # BLD AUTO: 4.75 M/UL (ref 4.6–6.2)
SODIUM SERPL-SCNC: 139 MMOL/L (ref 136–145)
TRIGL SERPL-MCNC: 73 MG/DL (ref 30–150)
WBC # BLD AUTO: 7.7 K/UL (ref 3.9–12.7)

## 2020-02-19 PROCEDURE — 27201423 OPTIME MED/SURG SUP & DEVICES STERILE SUPPLY: Performed by: PODIATRIST

## 2020-02-19 PROCEDURE — 85025 COMPLETE CBC W/AUTO DIFF WBC: CPT

## 2020-02-19 PROCEDURE — 37000008 HC ANESTHESIA 1ST 15 MINUTES: Performed by: PODIATRIST

## 2020-02-19 PROCEDURE — 80061 LIPID PANEL: CPT

## 2020-02-19 PROCEDURE — 11042 PR DEBRIDEMENT, SKIN, SUB-Q TISSUE,=<20 SQ CM: ICD-10-PCS | Mod: ,,, | Performed by: PODIATRIST

## 2020-02-19 PROCEDURE — 36000706: Performed by: PODIATRIST

## 2020-02-19 PROCEDURE — 37000009 HC ANESTHESIA EA ADD 15 MINS: Performed by: PODIATRIST

## 2020-02-19 PROCEDURE — 71000039 HC RECOVERY, EACH ADD'L HOUR: Performed by: PODIATRIST

## 2020-02-19 PROCEDURE — 27000654 HC CATH IV JELCO: Performed by: ANESTHESIOLOGY

## 2020-02-19 PROCEDURE — 96366 THER/PROPH/DIAG IV INF ADDON: CPT

## 2020-02-19 PROCEDURE — 00400 ANES INTEGUMENTARY SYS NOS: CPT | Performed by: PODIATRIST

## 2020-02-19 PROCEDURE — 80053 COMPREHEN METABOLIC PANEL: CPT

## 2020-02-19 PROCEDURE — 36000707: Performed by: PODIATRIST

## 2020-02-19 PROCEDURE — 11042 DBRDMT SUBQ TIS 1ST 20SQCM/<: CPT | Mod: ,,, | Performed by: PODIATRIST

## 2020-02-19 PROCEDURE — 25000003 PHARM REV CODE 250: Performed by: ANESTHESIOLOGY

## 2020-02-19 PROCEDURE — 27000671 HC TUBING MICROBORE EXT: Performed by: ANESTHESIOLOGY

## 2020-02-19 PROCEDURE — 99203 OFFICE O/P NEW LOW 30 MIN: CPT | Mod: 25,,, | Performed by: PODIATRIST

## 2020-02-19 PROCEDURE — 71000033 HC RECOVERY, INTIAL HOUR: Performed by: PODIATRIST

## 2020-02-19 PROCEDURE — 25000003 PHARM REV CODE 250: Performed by: INTERNAL MEDICINE

## 2020-02-19 PROCEDURE — G0378 HOSPITAL OBSERVATION PER HR: HCPCS

## 2020-02-19 PROCEDURE — 96365 THER/PROPH/DIAG IV INF INIT: CPT

## 2020-02-19 PROCEDURE — 63600175 PHARM REV CODE 636 W HCPCS: Performed by: NURSE ANESTHETIST, CERTIFIED REGISTERED

## 2020-02-19 PROCEDURE — 99203 PR OFFICE/OUTPT VISIT, NEW, LEVL III, 30-44 MIN: ICD-10-PCS | Mod: 25,,, | Performed by: PODIATRIST

## 2020-02-19 PROCEDURE — 83036 HEMOGLOBIN GLYCOSYLATED A1C: CPT

## 2020-02-19 PROCEDURE — 25000003 PHARM REV CODE 250: Performed by: NURSE ANESTHETIST, CERTIFIED REGISTERED

## 2020-02-19 PROCEDURE — 27000284 HC CANNULA NASAL: Performed by: ANESTHESIOLOGY

## 2020-02-19 PROCEDURE — 27000673 HC TUBING BLOOD Y: Performed by: ANESTHESIOLOGY

## 2020-02-19 PROCEDURE — 36415 COLL VENOUS BLD VENIPUNCTURE: CPT

## 2020-02-19 PROCEDURE — 63600175 PHARM REV CODE 636 W HCPCS: Performed by: EMERGENCY MEDICINE

## 2020-02-19 PROCEDURE — 83735 ASSAY OF MAGNESIUM: CPT

## 2020-02-19 RX ORDER — LANOLIN ALCOHOL/MO/W.PET/CERES
400 CREAM (GRAM) TOPICAL DAILY
Status: DISCONTINUED | OUTPATIENT
Start: 2020-02-19 | End: 2020-02-21 | Stop reason: HOSPADM

## 2020-02-19 RX ORDER — ACETAMINOPHEN 500 MG
1000 TABLET ORAL ONCE
Status: COMPLETED | OUTPATIENT
Start: 2020-02-19 | End: 2020-02-19

## 2020-02-19 RX ORDER — IBUPROFEN 200 MG
24 TABLET ORAL
Status: DISCONTINUED | OUTPATIENT
Start: 2020-02-19 | End: 2020-02-21 | Stop reason: HOSPADM

## 2020-02-19 RX ORDER — AMITRIPTYLINE HYDROCHLORIDE 25 MG/1
100 TABLET, FILM COATED ORAL 3 TIMES DAILY
Status: DISCONTINUED | OUTPATIENT
Start: 2020-02-19 | End: 2020-02-21 | Stop reason: HOSPADM

## 2020-02-19 RX ORDER — ONDANSETRON 4 MG/1
8 TABLET, ORALLY DISINTEGRATING ORAL EVERY 8 HOURS PRN
Status: DISCONTINUED | OUTPATIENT
Start: 2020-02-19 | End: 2020-02-21 | Stop reason: HOSPADM

## 2020-02-19 RX ORDER — ONDANSETRON 2 MG/ML
INJECTION INTRAMUSCULAR; INTRAVENOUS
Status: DISCONTINUED | OUTPATIENT
Start: 2020-02-19 | End: 2020-02-19

## 2020-02-19 RX ORDER — OXYCODONE HYDROCHLORIDE 5 MG/1
5 TABLET ORAL
Status: DISCONTINUED | OUTPATIENT
Start: 2020-02-19 | End: 2020-02-21 | Stop reason: HOSPADM

## 2020-02-19 RX ORDER — SODIUM CHLORIDE, SODIUM LACTATE, POTASSIUM CHLORIDE, CALCIUM CHLORIDE 600; 310; 30; 20 MG/100ML; MG/100ML; MG/100ML; MG/100ML
INJECTION, SOLUTION INTRAVENOUS CONTINUOUS PRN
Status: DISCONTINUED | OUTPATIENT
Start: 2020-02-19 | End: 2020-02-19

## 2020-02-19 RX ORDER — CLONIDINE HYDROCHLORIDE 0.1 MG/1
0.1 TABLET ORAL 4 TIMES DAILY PRN
Status: DISCONTINUED | OUTPATIENT
Start: 2020-02-19 | End: 2020-02-21 | Stop reason: HOSPADM

## 2020-02-19 RX ORDER — HYDROCODONE BITARTRATE AND ACETAMINOPHEN 5; 325 MG/1; MG/1
1 TABLET ORAL EVERY 6 HOURS PRN
Status: DISCONTINUED | OUTPATIENT
Start: 2020-02-19 | End: 2020-02-21 | Stop reason: HOSPADM

## 2020-02-19 RX ORDER — BACLOFEN 10 MG/1
20 TABLET ORAL 4 TIMES DAILY
Status: DISCONTINUED | OUTPATIENT
Start: 2020-02-19 | End: 2020-02-19

## 2020-02-19 RX ORDER — SODIUM CHLORIDE 0.9 % (FLUSH) 0.9 %
3 SYRINGE (ML) INJECTION EVERY 6 HOURS PRN
Status: DISCONTINUED | OUTPATIENT
Start: 2020-02-19 | End: 2020-02-21 | Stop reason: HOSPADM

## 2020-02-19 RX ORDER — FUROSEMIDE 40 MG/1
40 TABLET ORAL DAILY
Status: DISCONTINUED | OUTPATIENT
Start: 2020-02-19 | End: 2020-02-21 | Stop reason: HOSPADM

## 2020-02-19 RX ORDER — MEPERIDINE HYDROCHLORIDE 50 MG/ML
12.5 INJECTION INTRAMUSCULAR; INTRAVENOUS; SUBCUTANEOUS EVERY 10 MIN PRN
Status: ACTIVE | OUTPATIENT
Start: 2020-02-19 | End: 2020-02-19

## 2020-02-19 RX ORDER — DIPHENHYDRAMINE HYDROCHLORIDE 50 MG/ML
12.5 INJECTION INTRAMUSCULAR; INTRAVENOUS
Status: DISCONTINUED | OUTPATIENT
Start: 2020-02-19 | End: 2020-02-21 | Stop reason: HOSPADM

## 2020-02-19 RX ORDER — BACLOFEN 10 MG/1
20 TABLET ORAL 4 TIMES DAILY
Status: DISCONTINUED | OUTPATIENT
Start: 2020-02-19 | End: 2020-02-21 | Stop reason: HOSPADM

## 2020-02-19 RX ORDER — BISACODYL 5 MG
10 TABLET, DELAYED RELEASE (ENTERIC COATED) ORAL NIGHTLY PRN
Status: DISCONTINUED | OUTPATIENT
Start: 2020-02-19 | End: 2020-02-21 | Stop reason: HOSPADM

## 2020-02-19 RX ORDER — PROPOFOL 10 MG/ML
VIAL (ML) INTRAVENOUS
Status: DISCONTINUED | OUTPATIENT
Start: 2020-02-19 | End: 2020-02-19

## 2020-02-19 RX ORDER — TALC
9 POWDER (GRAM) TOPICAL NIGHTLY PRN
Status: DISCONTINUED | OUTPATIENT
Start: 2020-02-19 | End: 2020-02-21 | Stop reason: HOSPADM

## 2020-02-19 RX ORDER — ONDANSETRON 2 MG/ML
4 INJECTION INTRAMUSCULAR; INTRAVENOUS DAILY PRN
Status: DISCONTINUED | OUTPATIENT
Start: 2020-02-19 | End: 2020-02-21 | Stop reason: HOSPADM

## 2020-02-19 RX ORDER — AMITRIPTYLINE HYDROCHLORIDE 25 MG/1
100 TABLET, FILM COATED ORAL 3 TIMES DAILY
Status: DISCONTINUED | OUTPATIENT
Start: 2020-02-19 | End: 2020-02-19

## 2020-02-19 RX ORDER — LIDOCAINE HYDROCHLORIDE 10 MG/ML
INJECTION, SOLUTION EPIDURAL; INFILTRATION; INTRACAUDAL; PERINEURAL
Status: DISCONTINUED | OUTPATIENT
Start: 2020-02-19 | End: 2020-02-19

## 2020-02-19 RX ORDER — DULOXETIN HYDROCHLORIDE 30 MG/1
60 CAPSULE, DELAYED RELEASE ORAL 2 TIMES DAILY
Status: DISCONTINUED | OUTPATIENT
Start: 2020-02-19 | End: 2020-02-21 | Stop reason: HOSPADM

## 2020-02-19 RX ORDER — GABAPENTIN 300 MG/1
1200 CAPSULE ORAL 3 TIMES DAILY
Status: DISCONTINUED | OUTPATIENT
Start: 2020-02-19 | End: 2020-02-19

## 2020-02-19 RX ORDER — FENTANYL CITRATE 50 UG/ML
INJECTION, SOLUTION INTRAMUSCULAR; INTRAVENOUS
Status: DISCONTINUED | OUTPATIENT
Start: 2020-02-19 | End: 2020-02-19

## 2020-02-19 RX ORDER — IBUPROFEN 200 MG
16 TABLET ORAL
Status: DISCONTINUED | OUTPATIENT
Start: 2020-02-19 | End: 2020-02-21 | Stop reason: HOSPADM

## 2020-02-19 RX ORDER — ACETAMINOPHEN 325 MG/1
650 TABLET ORAL EVERY 4 HOURS PRN
Status: DISCONTINUED | OUTPATIENT
Start: 2020-02-19 | End: 2020-02-21 | Stop reason: HOSPADM

## 2020-02-19 RX ORDER — MIDAZOLAM HYDROCHLORIDE 1 MG/ML
INJECTION INTRAMUSCULAR; INTRAVENOUS
Status: DISCONTINUED | OUTPATIENT
Start: 2020-02-19 | End: 2020-02-19

## 2020-02-19 RX ORDER — GABAPENTIN 300 MG/1
1200 CAPSULE ORAL 3 TIMES DAILY
Status: DISCONTINUED | OUTPATIENT
Start: 2020-02-19 | End: 2020-02-21 | Stop reason: HOSPADM

## 2020-02-19 RX ORDER — CALCIUM CARBONATE 200(500)MG
2 TABLET,CHEWABLE ORAL 4 TIMES DAILY PRN
Status: DISCONTINUED | OUTPATIENT
Start: 2020-02-19 | End: 2020-02-21 | Stop reason: HOSPADM

## 2020-02-19 RX ORDER — SODIUM CHLORIDE 0.9 % (FLUSH) 0.9 %
10 SYRINGE (ML) INJECTION
Status: DISCONTINUED | OUTPATIENT
Start: 2020-02-19 | End: 2020-02-21 | Stop reason: HOSPADM

## 2020-02-19 RX ORDER — GABAPENTIN 300 MG/1
1200 CAPSULE ORAL ONCE
Status: COMPLETED | OUTPATIENT
Start: 2020-02-19 | End: 2020-02-19

## 2020-02-19 RX ORDER — HYDROMORPHONE HYDROCHLORIDE 1 MG/ML
0.2 INJECTION, SOLUTION INTRAMUSCULAR; INTRAVENOUS; SUBCUTANEOUS
Status: DISCONTINUED | OUTPATIENT
Start: 2020-02-19 | End: 2020-02-21 | Stop reason: HOSPADM

## 2020-02-19 RX ORDER — GLUCAGON 1 MG
1 KIT INJECTION
Status: DISCONTINUED | OUTPATIENT
Start: 2020-02-19 | End: 2020-02-21 | Stop reason: HOSPADM

## 2020-02-19 RX ORDER — ENALAPRIL MALEATE 2.5 MG/1
5 TABLET ORAL DAILY
Status: DISCONTINUED | OUTPATIENT
Start: 2020-02-19 | End: 2020-02-21 | Stop reason: HOSPADM

## 2020-02-19 RX ORDER — FAMOTIDINE 20 MG/1
20 TABLET, FILM COATED ORAL 2 TIMES DAILY
Status: DISCONTINUED | OUTPATIENT
Start: 2020-02-19 | End: 2020-02-21 | Stop reason: HOSPADM

## 2020-02-19 RX ADMIN — GABAPENTIN 1200 MG: 300 CAPSULE ORAL at 09:02

## 2020-02-19 RX ADMIN — ONDANSETRON 4 MG: 2 INJECTION INTRAMUSCULAR; INTRAVENOUS at 01:02

## 2020-02-19 RX ADMIN — BACLOFEN 20 MG: 10 TABLET ORAL at 03:02

## 2020-02-19 RX ADMIN — LIDOCAINE HYDROCHLORIDE 100 MG: 10 INJECTION, SOLUTION EPIDURAL; INFILTRATION; INTRACAUDAL; PERINEURAL at 01:02

## 2020-02-19 RX ADMIN — ACETAMINOPHEN 1000 MG: 500 TABLET, FILM COATED ORAL at 12:02

## 2020-02-19 RX ADMIN — PROPOFOL 20 MG: 10 INJECTION, EMULSION INTRAVENOUS at 01:02

## 2020-02-19 RX ADMIN — AMITRIPTYLINE HYDROCHLORIDE 100 MG: 25 TABLET, FILM COATED ORAL at 04:02

## 2020-02-19 RX ADMIN — AMITRIPTYLINE HYDROCHLORIDE 100 MG: 25 TABLET, FILM COATED ORAL at 03:02

## 2020-02-19 RX ADMIN — BACLOFEN 20 MG: 10 TABLET ORAL at 04:02

## 2020-02-19 RX ADMIN — AMITRIPTYLINE HYDROCHLORIDE 100 MG: 25 TABLET, FILM COATED ORAL at 09:02

## 2020-02-19 RX ADMIN — FENTANYL CITRATE 50 MCG: 50 INJECTION INTRAMUSCULAR; INTRAVENOUS at 01:02

## 2020-02-19 RX ADMIN — MIDAZOLAM HYDROCHLORIDE 2 MG: 1 INJECTION, SOLUTION INTRAMUSCULAR; INTRAVENOUS at 12:02

## 2020-02-19 RX ADMIN — SODIUM CHLORIDE, SODIUM LACTATE, POTASSIUM CHLORIDE, AND CALCIUM CHLORIDE: .6; .31; .03; .02 INJECTION, SOLUTION INTRAVENOUS at 12:02

## 2020-02-19 RX ADMIN — VANCOMYCIN HYDROCHLORIDE 1500 MG: 1 INJECTION, POWDER, LYOPHILIZED, FOR SOLUTION INTRAVENOUS at 10:02

## 2020-02-19 RX ADMIN — FAMOTIDINE 20 MG: 20 TABLET ORAL at 09:02

## 2020-02-19 RX ADMIN — DULOXETINE 60 MG: 30 CAPSULE, DELAYED RELEASE ORAL at 03:02

## 2020-02-19 RX ADMIN — GABAPENTIN 1200 MG: 300 CAPSULE ORAL at 04:02

## 2020-02-19 RX ADMIN — GABAPENTIN 1200 MG: 300 CAPSULE ORAL at 12:02

## 2020-02-19 RX ADMIN — GABAPENTIN 1200 MG: 300 CAPSULE ORAL at 03:02

## 2020-02-19 RX ADMIN — VANCOMYCIN HYDROCHLORIDE 1500 MG: 1.5 INJECTION, POWDER, LYOPHILIZED, FOR SOLUTION INTRAVENOUS at 12:02

## 2020-02-19 RX ADMIN — RIVAROXABAN 10 MG: 10 TABLET, FILM COATED ORAL at 04:02

## 2020-02-19 RX ADMIN — DULOXETINE 60 MG: 30 CAPSULE, DELAYED RELEASE ORAL at 09:02

## 2020-02-19 RX ADMIN — VANCOMYCIN HYDROCHLORIDE 1500 MG: 1 INJECTION, POWDER, LYOPHILIZED, FOR SOLUTION INTRAVENOUS at 08:02

## 2020-02-19 RX ADMIN — BACLOFEN 20 MG: 10 TABLET ORAL at 09:02
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
## 2020-02-19 NOTE — NURSING
Patient admitted to room 1213 via wheelchair from the ER.  He transferred from his own wheelchair to bed independently.  NAD.  No family at bedside.

## 2020-02-19 NOTE — ED PROVIDER NOTES
Encounter Date: 2/18/2020       History     Chief Complaint   Patient presents with    Leg Swelling     BILAT, X 1 MOS, DROPPED OFF BY [facility]    SKIN SORES     HPI   22-year-old male with past medical history significant for paraplegia secondary to L1 burst fracture, neuropathy, depression, muscle spasms, neurogenic bladder, bowel incontinence presents to the ER from [facility] for evaluation of bilateral lower extremities. Patient states he has chronic wounds on his bilateral ft secondary to resting his foot on his wheelchair bars.  Patient states that last week he had a wound debridement by a podiatrist and since then he has had increased swelling and redness over his bilateral feet.  Patient denies fevers, chills, sensation of feeling unwell, nausea, vomiting, shortness of breath or chest pain. Patient states he is not on antibiotics.  Review of patient's allergies indicates:  No Known Allergies  Past Medical History:   Diagnosis Date    Alcohol abuse     Depression     Insomnia     Paraplegia      No past surgical history on file.  No family history on file.  Social History     Tobacco Use    Smoking status: Not on file   Substance Use Topics    Alcohol use: Not on file    Drug use: Not on file     Review of Systems   Constitutional: Negative for appetite change, chills, diaphoresis and fever.   HENT: Negative for ear pain, rhinorrhea, sore throat, trouble swallowing and voice change.    Eyes: Negative for pain, discharge, redness and visual disturbance.   Respiratory: Negative for cough, chest tightness and shortness of breath.    Cardiovascular: Negative for chest pain and leg swelling.        Edema   Gastrointestinal: Negative for abdominal pain, constipation, diarrhea, nausea and vomiting.   Genitourinary: Negative for difficulty urinating, dysuria, flank pain, frequency and urgency.   Musculoskeletal: Negative for arthralgias, back pain and myalgias.   Skin: Negative for  rash.        Chronic wounds    Neurological: Negative for dizziness, syncope, speech difficulty, weakness, light-headedness, numbness and headaches.       Physical Exam     Initial Vitals [02/18/20 1648]   BP Pulse Resp Temp SpO2   137/82 100 18 99.7 °F (37.6 °C) 99 %      MAP       --         Physical Exam    Nursing note and vitals reviewed.  Constitutional: He appears well-developed and well-nourished. He is not diaphoretic. He is cooperative.  Non-toxic appearance. He does not have a sickly appearance. He does not appear ill. No distress.   HENT:   Head: Normocephalic and atraumatic. Head is without abrasion, without right periorbital erythema and without left periorbital erythema.   Right Ear: Hearing and external ear normal. No drainage or tenderness.   Left Ear: Hearing and external ear normal. No drainage or tenderness.   Nose: No rhinorrhea, sinus tenderness or nasal septal hematoma. No epistaxis.  No foreign bodies. Right sinus exhibits no frontal sinus tenderness. Left sinus exhibits no frontal sinus tenderness.   Mouth/Throat: Uvula is midline and mucous membranes are normal. No oral lesions. No trismus in the jaw. No dental abscesses or uvula swelling. No oropharyngeal exudate, posterior oropharyngeal edema, posterior oropharyngeal erythema or tonsillar abscesses.   Eyes: Lids are normal. Pupils are equal, round, and reactive to light. Right eye exhibits no chemosis, no discharge and no exudate. Left eye exhibits no chemosis, no discharge and no exudate. Right conjunctiva is not injected. Right conjunctiva has no hemorrhage. Left conjunctiva is not injected. Left conjunctiva has no hemorrhage. No scleral icterus. Right eye exhibits normal extraocular motion. Left eye exhibits normal extraocular motion.   Neck: Trachea normal and normal range of motion. Neck supple. No stridor present. No spinous process tenderness and no muscular tenderness present. No tracheal deviation and no edema present. No neck  rigidity. No JVD present.   Cardiovascular: Regular rhythm, normal heart sounds and normal pulses.   Pulmonary/Chest: Breath sounds normal.   Abdominal: Soft. Bowel sounds are normal. He exhibits no distension, no ascites and no mass. There is no hepatosplenomegaly. There is no tenderness. There is no rigidity, no rebound, no guarding and no CVA tenderness. Hernia confirmed negative in the ventral area.   Musculoskeletal: Normal range of motion.   Lymphadenopathy:     He has no cervical adenopathy.   Neurological: He is alert. A sensory deficit is present. No cranial nerve deficit. GCS score is 15. GCS eye subscore is 4. GCS verbal subscore is 5. GCS motor subscore is 6.   Skin: Skin is warm. Capillary refill takes less than 2 seconds. No ecchymosis, no lesion and no rash noted. No erythema.   3cm in diameter  Ulcerated lesion noted on the lateral aspect of right foot with surrounding erythema and swelling    3cm in diameter Ulcerated lesion noted on lateral aspect of left foot with surrounding erythema and swelling   Psychiatric: He has a normal mood and affect. His speech is normal and behavior is normal. Judgment and thought content normal.         ED Course   Procedures  Labs Reviewed   CBC W/ AUTO DIFFERENTIAL - Abnormal; Notable for the following components:       Result Value    Hemoglobin 13.4 (*)     Mean Corpuscular Hemoglobin 26.4 (*)     Mean Corpuscular Hemoglobin Conc 31.8 (*)     RDW 15.5 (*)     Immature Granulocytes 0.6 (*)     Immature Grans (Abs) 0.05 (*)     All other components within normal limits   PROTIME-INR - Abnormal; Notable for the following components:    PT 15.0 (*)     All other components within normal limits   APTT - Abnormal; Notable for the following components:    aPTT 39.4 (*)     All other components within normal limits   C-REACTIVE PROTEIN - Abnormal; Notable for the following components:    CRP 4.23 (*)     All other components within normal limits   SEDIMENTATION RATE -  Abnormal; Notable for the following components:    Sed Rate 13 (*)     All other components within normal limits   CULTURE, BLOOD   CULTURE, BLOOD   COMPREHENSIVE METABOLIC PANEL   URINALYSIS, REFLEX TO URINE CULTURE    Narrative:     Specimen Source->Urine   DRUG SCREEN PANEL, URINE EMERGENCY    Narrative:     Specimen Source->Urine   LACTIC ACID, PLASMA          Imaging Results          US Lower Extremity Veins Bilateral (Final result)  Result time 02/18/20 19:25:55    Final result by Pop Perez MD (02/18/20 19:25:55)                 Impression:      Negative for DVT throughout bilateral lower extremities.      Electronically signed by: Pop Perez MD  Date:    02/18/2020  Time:    19:25             Narrative:    EXAMINATION:  US LOWER EXTREMITY VEINS BILATERAL    CLINICAL HISTORY:  Edema, unspecified    COMPARISON:  None    FINDINGS:  Grayscale, color Doppler, and spectral Doppler analysis was performed.    Bilateral common femoral, femoral, popliteal, and proximal great saphenous veins show normal compressibility, augmentation, and color Doppler flow.    Bilateral posterior tibial, anterior tibial, and peroneal veins are unremarkable.                                 Medical Decision Making:   Initial Assessment:   Patient with elevated CRP and bilateral wounds.  Will provide vancomycin and admit for further evaluation and possible wound debridement.                     ED Course as of Feb 19 0153   Tue Feb 18, 2020   1648 24-year-old male, paraplegic who lives at [facility].  Presents for to the emergency department for evaluation of bilateral lower extremity edema.  Symptoms have been present for an extended period of time.  Patient has developed some bullous lesions on his feet which were seen and evaluated by his doctor, Dr. Drake on January 27th.  Patient continues to have persistent lower extremity edema and nonhealing wounds to the bilateral feet.    [MP]      ED Course User  Index  [MP] Terence George MD                Clinical Impression:       ICD-10-CM ICD-9-CM   1. Pressure injury of skin, unspecified injury stage, unspecified location L89.90 707.00     707.20   2. Swelling R60.9 782.3   3. Chest pain R07.9 786.50                             Terence George MD  02/19/20 0153

## 2020-02-19 NOTE — PROGRESS NOTES
[facility]    Progress Note    Patient Name: Jose Durán  MRN: [id number]  Patient Class: OP- Observation   Admission Date: 2/18/2020  7:12 PM  Length of Stay: 0  Attending Physician: NICHOLAS REYES MD  Primary Care Provider: Teofilo High Jr, MD  Face-to-Face encounter date: 02/19/2020  Chief Complaint: Leg Swelling (BILAT, X 1 MOS, DROPPED OFF BY [facility]) and SKIN SORES         Patient ID: Jose Durán is a 24 y.o. male admitted for   Active Hospital Problems    Diagnosis  POA    *Foot ulcer with fat layer exposed bilateral [L97.502]  Yes    Pressure injury of skin [L89.90]  Yes      Resolved Hospital Problems   No resolved problems to display.      HISTORY OF PRESENT ILLNESS by Dr Obando on admission 02/18/2020:   Jose Durán is a 24 y.o. Black or  male who  has a past medical history of Alcohol abuse, Depression, Insomnia, and Paraplegia.. The patient presented to [facility] on 2/18/2020 with a primary complaint of foot ulcers.     History was obtained from the patient and ER physician Sign-out. As per the patient, he developed foot ulcers because he sleeps in wheel chair and developed blisters on the foot in areas that are in contact with the wheel chair. He has seen a doctor who did debridement but they continue to get worst. Along with that he also reports more swelling on the legs bilaterally. No tenderness or erythema noted.     In the emergency room, patient CBC was unremarkable. CMP was unremarkable. Decision to admit was taken and patient was informed       Subjective:    Interval History: Patient is doing well. Laying in bed, reports he has been seen by the podiatrist and plan for small procedure today.  No Family present at bedside.  No concerns/issues overnight reported by the patient or the nursing staff.    Review of Systems All other Review of Systems were found to be negative expect for that mentioned already in HPI.      Objective:     Physical Exam  Vitals:    02/19/20 1600   BP: (!) 104/54   Pulse: 87   Resp: 18   Temp: 98.2 °F (36.8 °C)     Wt Readings from Last 1 Encounters:   02/19/20 99.1 kg (218 lb 7.6 oz)      Body mass index is 33.22 kg/m².    Vitals reviewed.  Constitutional: No distress. Laying in bed comfortably  HENT:  Normocephalic  Head: Atraumatic.  Moist mucous membranes  Cardiovascular: Normal rate, regular rhythm and normal heart sounds. B.L LE Pedal and pretibial edema  Pulmonary/Chest: Effort normal. No wheezes.   Abdominal: Soft. Bowel sounds are normal. No distension and no mass. No tenderness  Neurological: Alert and oriented.  Paraplegic  Musculoskeletal  Skin:  Grade 2 ulcerations bilateral plantar feet with necrotic tissue full-thickness and necrotic fat    Following labs were Reviewed   CBC:  Recent Labs   Lab 02/19/20  0447   WBC 7.70   HGB 12.4*   HCT 39.9*        CMP:  Recent Labs   Lab 02/19/20  0447   CALCIUM 9.1   ALBUMIN 3.5   PROT 7.1      K 3.8   CO2 28      BUN 10   CREATININE 0.7   ALKPHOS 100   ALT 37   AST 22   BILITOT 0.6     Last 72 hour POCT GLUCOSE  No results found for: POCTGLUCOSE     Microbiology Results (last 7 days)     Procedure Component Value Units Date/Time    Blood Culture #1 **CANNOT BE ORDERED STAT** [[id number]] Collected:  02/18/20 1719    Order Status:  Completed Specimen:  Blood from Peripheral, Antecubital, Right Updated:  02/19/20 0422     Blood Culture, Routine No Growth to date    Blood Culture #1 **CANNOT BE ORDERED STAT** [[id number]]     Order Status:  No result Specimen:  Blood             US Lower Extremity Veins Bilateral   Final Result      Negative for DVT throughout bilateral lower extremities.         Electronically signed by: Pop Perez MD   Date:    02/18/2020   Time:    19:25            Scheduled Meds:   amitriptyline  100 mg Oral TID    baclofen  20 mg Oral QID    DULoxetine  60 mg Oral BID    enalapril  5 mg Oral Daily     famotidine  20 mg Oral BID    furosemide  40 mg Oral Daily    gabapentin  1,200 mg Oral TID    magnesium oxide  400 mg Oral Daily    oxybutynin  15 mg Oral Daily    rivaroxaban  10 mg Oral Daily with dinner    vancomycin (VANCOCIN) IVPB  1,500 mg Intravenous Q12H     Continuous Infusions:  PRN Meds:.acetaminophen, bisacodyL, calcium carbonate, cloNIDine, dextrose 50%, dextrose 50%, diphenhydrAMINE, glucagon (human recombinant), glucose, glucose, HYDROcodone-acetaminophen, HYDROmorphone, melatonin, meperidine, ondansetron, ondansetron, oxyCODONE, promethazine (PHENERGAN) IVPB, sodium chloride 0.9%, sodium chloride 0.9%, Pharmacy to dose Vancomycin consult **AND** vancomycin - pharmacy to dose      Assessment & Plan:     Grade 2 ulcerations bilateral plantar feet with necrotic tissue full-thickness and necrotic fat:   admit to Med-surg.   Dr. Tidwell consulted.   Patient is status post excisional full-thickness debridement of necrotic tissue and subcutaneous tissue and fat plantar of the left foot and right foot under general anesthesia  Continue IV Vancomycin, pharmacy consulted for dosing  Do not think its osteomyelitis and no need of MRI at this point.   Counseled to sleep in a different position and not in wheel chair.   Consider different kinds of boots to release the pressure.  Consulted wound care  Patient has a history of multiple ulceration to both feet which has been treated periodically with debridement and wound care but failed to heal    Lower extremity edema  Advised patient to elevate the legs above the heart level  Advised against sitting in wheelchair in sleeping in wheelchair  Discussed low-salt diet and proper hydration with 64 also water per day    DVT Prophylaxis: will be placed on home oral DOAC for DVT prophylaxis and will be advised to be as mobile as possible and sit in a chair as tolerated.     Discharge Planning:   Not yet planned    Above encounter included review of the medical  records, interviewing and examining the patient face-to-face, discussion with family and other health care providers, ordering and interpreting lab/test results and formulating a plan of care.     Medical Decision Making:      [_] Low Complexity  [_] Moderate Complexity  [x] High Complexity      Christopher Pollard MD  Department of Hospital Medicine   [facility]

## 2020-02-19 NOTE — OP NOTE
Operative Report     Patient name: Jose Durán   MRN: [id number]  Date of surgery: 2/19/2020    Surgeon: Emigdio Tidwell DPM   Assistant:  Norbert Linda DPM, PGY 3    Preoperative diagnosis:  Grade 2 ulcerations bilateral plantar feet with necrotic tissue full-thickness and necrotic fat  Postoperative diagnosis:  Same as the above  Procedure:  1..  Left foot excisional full-thickness debridement of necrotic skin and subcutaneous tissue and fat plantar left foot  2.  Right foot excisional full-thickness debridement of necrotic skin and subcutaneous tissue fat plantar right foot  Anesthesia:  IV sedation monitored anesthesia care  Hemostasis:  Ankle tourniquet 250 mm of mercury  Estimated blood loss:  10 cc   Specimen:  Necrotic tissue not sent for histopathology or culture  Complications: None  Condition upon discharge: Stable                        Procedure in detail:  The patient brought the operating room placed on the operating table in a supine position had IV sedation monitored anesthesia care performed.  After the usual aseptic prep and drape of both feet I use a combination of 15 blade pickup and curette and a full-thickness excision of all necrotic tissue on the plantar surface of both feet.  I get a down to bleeding tissue which is in the fat layer.  I had to debride skin and fat and subcutaneous tissue to get bleeding tissue.  There was no exposed bone.  I irrigated the areas and dressed both ulcers with Adaptic 4x4s Kerlix and an Ace wrap.  The patient tolerated procedures well at the operating room with stable vitals.  Prognosis is poor because of continue re-ulcerations with sitting in wheelchair than noncompliance.    I will order wound care start seen the patient daily apply in Santyl and re-dress.  I will follow up with him as needed.

## 2020-02-19 NOTE — PROGRESS NOTES
VANCOMYCIN PHARMACOKINETIC NOTE:  Vancomycin Day # 1    Objective/Assessment:    Diagnosis/Indication for Vancomycin: Skin & Soft Tissue infection     24 y.o., male; Actual Body Weight = 99.8 kg (220 lb).    The patient has the following labs:  2/19/2020 Estimated Creatinine Clearance: 163.1 mL/min (based on SCr of 0.8 mg/dL). Lab Results   Component Value Date    BUN 10 02/18/2020       Lab Results   Component Value Date    WBC 8.82 02/18/2020              Plan:  Adjust vancomycin dose and/or frequency based on the patient's actual weight and renal function:  Initiate Vancomycin 1500 mg IV every 12 hours.  Orders have been entered into patient's chart.    Vancomycin dose = 15.0 mg/kg actual body weight    Vancomycin trough level has been ordered for 2/20 @08:00, prior to 4th dose.    Pharmacy will manage vancomycin therapy, monitor serum vancomycin levels, monitor renal function and adjust regimen as necessary.      Thank you for allowing us to participate in this patient's care.     Alen Ayoub 2/19/2020 12:06 AM  Department of Pharmacy  Ext [phone]

## 2020-02-19 NOTE — ANESTHESIA POSTPROCEDURE EVALUATION
Anesthesia Post Evaluation    Patient: Jose Durán    Procedure(s) Performed: Procedure(s) (LRB):  INCISION AND DRAINAGE, FOOT DEBRIDEMENT (Bilateral)    Final Anesthesia Type: MAC    Patient location during evaluation: PACU  Patient participation: Yes- Able to Participate  Level of consciousness: awake and alert  Post-procedure vital signs: reviewed and stable  Pain management: adequate  Airway patency: patent    PONV status at discharge: No PONV  Anesthetic complications: no      Cardiovascular status: blood pressure returned to baseline and stable  Respiratory status: unassisted and room air  Hydration status: euvolemic  Follow-up not needed.          Vitals Value Taken Time   /72 2/19/2020  2:15 PM   Temp 36.8 °C (98.3 °F) 2/19/2020 12:00 PM   Pulse 75 2/19/2020  2:25 PM   Resp 13 2/19/2020  2:25 PM   SpO2 98 % 2/19/2020  2:25 PM   Vitals shown include unvalidated device data.      No case tracking events are documented in the log.      Pain/Margaret Score: Pain Rating Prior to Med Admin: 0 (2/19/2020 12:23 PM)
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
## 2020-02-19 NOTE — TRANSFER OF CARE
"Anesthesia Transfer of Care Note    Patient: Jose Durán    Procedure(s) Performed: Procedure(s) (LRB):  INCISION AND DRAINAGE, FOOT DEBRIDEMENT (Bilateral)    Patient location: PACU    Anesthesia Type: MAC    Transport from OR: Transported from OR on room air with adequate spontaneous ventilation    Post pain: adequate analgesia    Post assessment: no apparent anesthetic complications    Post vital signs: stable    Level of consciousness: awake    Nausea/Vomiting: no nausea/vomiting    Complications: none    Transfer of care protocol was followed      Last vitals:   Visit Vitals  BP (!) 119/59   Pulse 75   Temp 36.8 °C (98.3 °F) (Oral)   Resp 18   Ht 5' 8" (1.727 m)   Wt 99.1 kg (218 lb 7.6 oz)   SpO2 99%   BMI 33.22 kg/m²     "

## 2020-02-19 NOTE — CONSULTS
Inpatient consult to Podiatry  Consult performed by: Emigdio Tidwell DPM  Consult ordered by: Linda Obando MD       Identifying data:  A 24-year-old male    Chief complaint bilateral ulcerations of feet    History of present illness:  Patient is paraplegic as multiple ulcerations of both feet that have been treated periodically with debridement wound care and failed to heal.    Objective:  Vascular:  Pedal pulses intact +3 edema bilateral feet legs  Neurological:  No sensation of feet no muscle power to feet flaccid paralysis.  Musculoskeletal no gross deformities  Integument:  Multiple ulcerations bilateral feet with necrotic bases no fluctuance no purulence no exposed bone.    Assessment:  Paraplegic with multiple decubitus ulcers of both feet there are necrotic.    Plan:  All take patient to operating room do a full-thickness excisional debridement of necrotic tissue and then order wound care.  Prognosis probably not good for these feet because he sits in a wheelchair puts pressure on them all day. He has failed to respond any of the previous procedures and wound care.

## 2020-02-19 NOTE — PLAN OF CARE
Problem: Fall Injury Risk  Goal: Absence of Fall and Fall-Related Injury  Outcome: Ongoing, Progressing     Problem: Adult Inpatient Plan of Care  Goal: Plan of Care Review  Outcome: Ongoing, Progressing  Goal: Patient-Specific Goal (Individualization)  Outcome: Ongoing, Progressing  Goal: Absence of Hospital-Acquired Illness or Injury  Outcome: Ongoing, Progressing  Goal: Optimal Comfort and Wellbeing  Outcome: Ongoing, Progressing  Goal: Readiness for Transition of Care  Outcome: Ongoing, Progressing  Goal: Rounds/Family Conference  Outcome: Ongoing, Progressing     Problem: Skin Injury Risk Increased  Goal: Skin Health and Integrity  Outcome: Ongoing, Progressing     Problem: Wound  Goal: Optimal Wound Healing  Outcome: Ongoing, Progressing

## 2020-02-19 NOTE — ED NOTES
LOC: The patient is awake, alert and aware of environment with an appropriate affect, the patient is oriented x 3 and speaking appropriately.  APPEARANCE: Patient resting comfortably and in no acute distress, patient is clean and well groomed, patient's clothing properly fastened.  SKIN: The skin is warm and dry, color consistent with ethnicity, patient has normal skin turgor and moist mucus membranes, skin intact with exception to skin sores on bilateral lower extremities.   MUSKULOSKELETAL: Patient moving all extremities well, no obvious swelling or deformities noted.  RESPIRATORY: Airway is open and patent, respirations are spontaneous, patient has a normal effort and rate, no accessory muscle use noted.  CARDIAC: Patient has a normal rate and rhythm, 2+ edema noted to bilateral lower extremities, capillary refill < 3 seconds.  ABDOMEN: Soft and non tender to palpation, no distention noted.  NEUROLOGIC: PERRL, 3 mm bilaterally, eyes open spontaneously, behavior appropriate to situation, follows commands, facial expression symmetrical, bilateral hand grasp equal and even, purposeful motor response noted, normal sensation in all extremities when touched with a finger.

## 2020-02-20 LAB
ANION GAP SERPL CALC-SCNC: 4 MMOL/L (ref 8–16)
BASOPHILS # BLD AUTO: 0.06 K/UL (ref 0–0.2)
BASOPHILS NFR BLD: 0.8 % (ref 0–1.9)
BUN SERPL-MCNC: 8 MG/DL (ref 6–20)
CALCIUM SERPL-MCNC: 8.7 MG/DL (ref 8.7–10.5)
CHLORIDE SERPL-SCNC: 107 MMOL/L (ref 95–110)
CO2 SERPL-SCNC: 27 MMOL/L (ref 23–29)
CREAT SERPL-MCNC: 0.7 MG/DL (ref 0.5–1.4)
DIFFERENTIAL METHOD: ABNORMAL
EOSINOPHIL # BLD AUTO: 0.3 K/UL (ref 0–0.5)
EOSINOPHIL NFR BLD: 3.6 % (ref 0–8)
ERYTHROCYTE [DISTWIDTH] IN BLOOD BY AUTOMATED COUNT: 15.3 % (ref 11.5–14.5)
EST. GFR  (AFRICAN AMERICAN): >60 ML/MIN/1.73 M^2
EST. GFR  (NON AFRICAN AMERICAN): >60 ML/MIN/1.73 M^2
GLUCOSE SERPL-MCNC: 97 MG/DL (ref 70–110)
HCT VFR BLD AUTO: 40.2 % (ref 40–54)
HGB BLD-MCNC: 12.6 G/DL (ref 14–18)
IMM GRANULOCYTES # BLD AUTO: 0.05 K/UL (ref 0–0.04)
IMM GRANULOCYTES NFR BLD AUTO: 0.6 % (ref 0–0.5)
LYMPHOCYTES # BLD AUTO: 1.9 K/UL (ref 1–4.8)
LYMPHOCYTES NFR BLD: 24.3 % (ref 18–48)
MAGNESIUM SERPL-MCNC: 1.8 MG/DL (ref 1.6–2.6)
MCH RBC QN AUTO: 26.4 PG (ref 27–31)
MCHC RBC AUTO-ENTMCNC: 31.3 G/DL (ref 32–36)
MCV RBC AUTO: 84 FL (ref 82–98)
MONOCYTES # BLD AUTO: 0.5 K/UL (ref 0.3–1)
MONOCYTES NFR BLD: 6.5 % (ref 4–15)
NEUTROPHILS # BLD AUTO: 5.1 K/UL (ref 1.8–7.7)
NEUTROPHILS NFR BLD: 64.2 % (ref 38–73)
NRBC BLD-RTO: 0 /100 WBC
PLATELET # BLD AUTO: 215 K/UL (ref 150–350)
PMV BLD AUTO: 11.9 FL (ref 9.2–12.9)
POTASSIUM SERPL-SCNC: 4.2 MMOL/L (ref 3.5–5.1)
RBC # BLD AUTO: 4.78 M/UL (ref 4.6–6.2)
SODIUM SERPL-SCNC: 138 MMOL/L (ref 136–145)
VANCOMYCIN TROUGH SERPL-MCNC: 11.5 UG/ML (ref 10–22)
WBC # BLD AUTO: 7.97 K/UL (ref 3.9–12.7)

## 2020-02-20 PROCEDURE — 36415 COLL VENOUS BLD VENIPUNCTURE: CPT

## 2020-02-20 PROCEDURE — 96366 THER/PROPH/DIAG IV INF ADDON: CPT

## 2020-02-20 PROCEDURE — 25000003 PHARM REV CODE 250: Performed by: PODIATRIST

## 2020-02-20 PROCEDURE — 80202 ASSAY OF VANCOMYCIN: CPT

## 2020-02-20 PROCEDURE — 83735 ASSAY OF MAGNESIUM: CPT

## 2020-02-20 PROCEDURE — G0378 HOSPITAL OBSERVATION PER HR: HCPCS

## 2020-02-20 PROCEDURE — 80048 BASIC METABOLIC PNL TOTAL CA: CPT

## 2020-02-20 PROCEDURE — 63600175 PHARM REV CODE 636 W HCPCS: Performed by: EMERGENCY MEDICINE

## 2020-02-20 PROCEDURE — 25000003 PHARM REV CODE 250: Performed by: INTERNAL MEDICINE

## 2020-02-20 PROCEDURE — 85025 COMPLETE CBC W/AUTO DIFF WBC: CPT

## 2020-02-20 RX ADMIN — FUROSEMIDE 40 MG: 40 TABLET ORAL at 09:02

## 2020-02-20 RX ADMIN — OXYBUTYNIN CHLORIDE 15 MG: 5 TABLET, EXTENDED RELEASE ORAL at 09:02

## 2020-02-20 RX ADMIN — AMITRIPTYLINE HYDROCHLORIDE 100 MG: 25 TABLET, FILM COATED ORAL at 02:02

## 2020-02-20 RX ADMIN — BACLOFEN 20 MG: 10 TABLET ORAL at 12:02

## 2020-02-20 RX ADMIN — DULOXETINE 60 MG: 30 CAPSULE, DELAYED RELEASE ORAL at 08:02

## 2020-02-20 RX ADMIN — FAMOTIDINE 20 MG: 20 TABLET ORAL at 09:02

## 2020-02-20 RX ADMIN — GABAPENTIN 1200 MG: 300 CAPSULE ORAL at 09:02

## 2020-02-20 RX ADMIN — BACLOFEN 20 MG: 10 TABLET ORAL at 04:02

## 2020-02-20 RX ADMIN — COLLAGENASE SANTYL: 250 OINTMENT TOPICAL at 02:02

## 2020-02-20 RX ADMIN — RIVAROXABAN 10 MG: 10 TABLET, FILM COATED ORAL at 05:02

## 2020-02-20 RX ADMIN — VANCOMYCIN HYDROCHLORIDE 1500 MG: 1 INJECTION, POWDER, LYOPHILIZED, FOR SOLUTION INTRAVENOUS at 08:02

## 2020-02-20 RX ADMIN — ENALAPRIL MALEATE 5 MG: 2.5 TABLET ORAL at 09:02

## 2020-02-20 RX ADMIN — VANCOMYCIN HYDROCHLORIDE 1500 MG: 1 INJECTION, POWDER, LYOPHILIZED, FOR SOLUTION INTRAVENOUS at 09:02

## 2020-02-20 RX ADMIN — MAGNESIUM OXIDE 400 MG: 400 TABLET ORAL at 09:02

## 2020-02-20 RX ADMIN — BACLOFEN 20 MG: 10 TABLET ORAL at 09:02

## 2020-02-20 RX ADMIN — AMITRIPTYLINE HYDROCHLORIDE 100 MG: 25 TABLET, FILM COATED ORAL at 09:02

## 2020-02-20 RX ADMIN — GABAPENTIN 1200 MG: 300 CAPSULE ORAL at 02:02

## 2020-02-20 RX ADMIN — DULOXETINE 60 MG: 30 CAPSULE, DELAYED RELEASE ORAL at 09:02

## 2020-02-20 RX ADMIN — BACLOFEN 20 MG: 10 TABLET ORAL at 08:02

## 2020-02-20 RX ADMIN — FAMOTIDINE 20 MG: 20 TABLET ORAL at 08:02

## 2020-02-20 RX ADMIN — GABAPENTIN 1200 MG: 300 CAPSULE ORAL at 08:02

## 2020-02-20 RX ADMIN — AMITRIPTYLINE HYDROCHLORIDE 100 MG: 25 TABLET, FILM COATED ORAL at 08:02

## 2020-02-20 NOTE — PROGRESS NOTES
VANCOMYCIN PHARMACOKINETIC NOTE:  Vancomycin Day # 3    Objective:    24 y.o., male, Actual Body Weight = 99.1 kg (218 lb 7.6 oz)    Diagnosis/Indication for Vancomycin:  Skin & Soft Tissue Infection   Desired Vancomycin Peak:  30-35 mcg/ml; Desired Trough: 10-15 mcg/ml    Current Vancomycin Regimen:  1500 mg IV every 12 hours    Date Time Dose # Dosage Serum Vancomycin Level Comments   2/18/20 21:15 1 1500 mg       2/19/20 10:00 2 1500 mg        20:59 3 1500 mg       2/20/20 08:37 -   11.5 mcg/ml Trough        The patient has the following labs:     2/20/2020 Estimated Creatinine Clearance: 185.7 mL/min (based on SCr of 0.7 mg/dL). Lab Results   Component Value Date    BUN 8 02/20/2020       Lab Results   Component Value Date    WBC 7.97 02/20/2020          Vancomycin Trough:  11.5 mcg/mL collected ~11.6 hours after Dose # 3 (drawn correctly).    Microbiology Results (last 7 days)       Procedure Component Value Units Date/Time    Blood Culture #1 **CANNOT BE ORDERED STAT** [[id number]] Collected:  02/18/20 1719    Order Status:  Completed Specimen:  Blood from Peripheral, Antecubital, Right Updated:  02/19/20 2232     Blood Culture, Routine No Growth to date      No Growth to date    Blood Culture #1 **CANNOT BE ORDERED STAT** [[id number]]     Order Status:  No result Specimen:  Blood              Assessment:  Vancomycin dose =  15.1 mg/kg  Renal function is: stable.  Vancomycin trough is within goal range.    Plan:  No change to current vancomycin regimen  Will obtain vancomycin trough after 4 more dose(s), prior to dose due 2/22/20 at 09:00.    Pharmacy will continue to manage vancomycin therapy and adjust regimen as necessary.    Thank you for allowing us to participate in this patient's care.     Jaycob Herman 2/20/2020 9:26 AM  Department of Pharmacy  Ext [phone]

## 2020-02-20 NOTE — PROGRESS NOTES
02/20/20 1400        Wound 02/19/20 0050 Ulceration plantar Foot   Date First Assessed/Time First Assessed: 02/19/20 0050   Pre-existing: Yes  Primary Wound Type: Ulceration  Side: Right  Orientation: plantar  Location: Foot   Wound Image    Dressing Appearance Dried drainage   Drainage Amount Moderate   Drainage Characteristics/Odor   (old bloody)   Appearance [place];Tan   Periwound Area   (dry peeling)   Wound Edges Open   Wound Length (cm) 3 cm   Wound Width (cm) 2.2 cm   Wound Depth (cm) 0.5 cm   Wound Volume (cm^3) 3.3 cm^3   Wound Surface Area (cm^2) 6.6 cm^2   Care Cleansed with:;Wound cleanser   Dressing   (santyl gauze abd kerlix ace)        Wound 02/19/20 0050 Ulceration plantar Foot   Date First Assessed/Time First Assessed: 02/19/20 0050   Pre-existing: Yes  Primary Wound Type: Ulceration  Side: Left  Orientation: plantar  Location: Foot   Wound Image    Dressing Appearance Dried drainage   Drainage Amount Moderate   Drainage Characteristics/Odor   (old bloody)   Appearance Pink;Tan;Black   Black (%), Wound Tissue Color 10 %   Red (%), Wound Tissue Color   (pink 60)   Yellow (%), Wound Tissue Color 30 %   Periwound Area   (dry peeling)   Wound Length (cm) 4 cm   Wound Width (cm) 2.8 cm   Wound Depth (cm) 0.8 cm   Wound Volume (cm^3) 8.96 cm^3   Wound Surface Area (cm^2) 11.2 cm^2   Care Cleansed with:;Wound cleanser   Dressing   (santyl gauze abd kerlix ace)        Burn 02/19/20 0050 Right anterior Thigh   Date of First Assessment/Time of First Assessment: 02/19/20 0050   Side: Right  Orientation: anterior  Location: Thigh   Wound Image    Dressing Appearance Dried drainage;Intact   Drainage Amount None   Drainage Characteristics/Odor   (bloody drainage)   Appearance Pink  (large healing burn from hot soup, with a small pink small 0.2x0.3x0.1cm)   Periwound Area Intact;Scar tissue  (peeling dry)   Wound Edges Open   Care Cleansed with:;Wound cleanser   Dressing   (medihoney, mepilex, small bandaid)

## 2020-02-21 VITALS
HEART RATE: 98 BPM | SYSTOLIC BLOOD PRESSURE: 122 MMHG | RESPIRATION RATE: 18 BRPM | BODY MASS INDEX: 33.12 KG/M2 | HEIGHT: 68 IN | WEIGHT: 218.5 LBS | TEMPERATURE: 99 F | DIASTOLIC BLOOD PRESSURE: 71 MMHG | OXYGEN SATURATION: 99 %

## 2020-02-21 PROBLEM — R60.0 BILATERAL LOWER EXTREMITY EDEMA: Status: ACTIVE | Noted: 2020-02-21

## 2020-02-21 LAB
ANION GAP SERPL CALC-SCNC: 9 MMOL/L (ref 8–16)
BASOPHILS # BLD AUTO: 0.06 K/UL (ref 0–0.2)
BASOPHILS NFR BLD: 0.7 % (ref 0–1.9)
BUN SERPL-MCNC: 10 MG/DL (ref 6–20)
CALCIUM SERPL-MCNC: 9.2 MG/DL (ref 8.7–10.5)
CHLORIDE SERPL-SCNC: 100 MMOL/L (ref 95–110)
CO2 SERPL-SCNC: 28 MMOL/L (ref 23–29)
CREAT SERPL-MCNC: 0.9 MG/DL (ref 0.5–1.4)
DIFFERENTIAL METHOD: ABNORMAL
EOSINOPHIL # BLD AUTO: 0.3 K/UL (ref 0–0.5)
EOSINOPHIL NFR BLD: 3.3 % (ref 0–8)
ERYTHROCYTE [DISTWIDTH] IN BLOOD BY AUTOMATED COUNT: 15.5 % (ref 11.5–14.5)
EST. GFR  (AFRICAN AMERICAN): >60 ML/MIN/1.73 M^2
EST. GFR  (NON AFRICAN AMERICAN): >60 ML/MIN/1.73 M^2
GLUCOSE SERPL-MCNC: 88 MG/DL (ref 70–110)
HCT VFR BLD AUTO: 42.5 % (ref 40–54)
HGB BLD-MCNC: 13 G/DL (ref 14–18)
IMM GRANULOCYTES # BLD AUTO: 0.06 K/UL (ref 0–0.04)
IMM GRANULOCYTES NFR BLD AUTO: 0.7 % (ref 0–0.5)
LYMPHOCYTES # BLD AUTO: 2.2 K/UL (ref 1–4.8)
LYMPHOCYTES NFR BLD: 25.7 % (ref 18–48)
MAGNESIUM SERPL-MCNC: 1.8 MG/DL (ref 1.6–2.6)
MCH RBC QN AUTO: 25.8 PG (ref 27–31)
MCHC RBC AUTO-ENTMCNC: 30.6 G/DL (ref 32–36)
MCV RBC AUTO: 85 FL (ref 82–98)
MONOCYTES # BLD AUTO: 0.6 K/UL (ref 0.3–1)
MONOCYTES NFR BLD: 6.7 % (ref 4–15)
NEUTROPHILS # BLD AUTO: 5.4 K/UL (ref 1.8–7.7)
NEUTROPHILS NFR BLD: 62.9 % (ref 38–73)
NRBC BLD-RTO: 0 /100 WBC
PLATELET # BLD AUTO: 225 K/UL (ref 150–350)
PMV BLD AUTO: 12.2 FL (ref 9.2–12.9)
POTASSIUM SERPL-SCNC: 3.8 MMOL/L (ref 3.5–5.1)
RBC # BLD AUTO: 5.03 M/UL (ref 4.6–6.2)
SODIUM SERPL-SCNC: 137 MMOL/L (ref 136–145)
WBC # BLD AUTO: 8.57 K/UL (ref 3.9–12.7)

## 2020-02-21 PROCEDURE — 63600175 PHARM REV CODE 636 W HCPCS: Performed by: EMERGENCY MEDICINE

## 2020-02-21 PROCEDURE — 83735 ASSAY OF MAGNESIUM: CPT

## 2020-02-21 PROCEDURE — 25000003 PHARM REV CODE 250: Performed by: INTERNAL MEDICINE

## 2020-02-21 PROCEDURE — 85025 COMPLETE CBC W/AUTO DIFF WBC: CPT

## 2020-02-21 PROCEDURE — 36415 COLL VENOUS BLD VENIPUNCTURE: CPT

## 2020-02-21 PROCEDURE — 96374 THER/PROPH/DIAG INJ IV PUSH: CPT | Mod: 59

## 2020-02-21 PROCEDURE — 80048 BASIC METABOLIC PNL TOTAL CA: CPT

## 2020-02-21 PROCEDURE — G0378 HOSPITAL OBSERVATION PER HR: HCPCS

## 2020-02-21 RX ORDER — DOXYCYCLINE 100 MG/1
100 CAPSULE ORAL EVERY 12 HOURS
Status: DISCONTINUED | OUTPATIENT
Start: 2020-02-21 | End: 2020-02-21 | Stop reason: HOSPADM

## 2020-02-21 RX ORDER — DOXYCYCLINE 100 MG/1
100 CAPSULE ORAL EVERY 12 HOURS
Qty: 20 CAPSULE
Start: 2020-02-21

## 2020-02-21 RX ADMIN — AMITRIPTYLINE HYDROCHLORIDE 100 MG: 25 TABLET, FILM COATED ORAL at 09:02

## 2020-02-21 RX ADMIN — MAGNESIUM OXIDE 400 MG: 400 TABLET ORAL at 09:02

## 2020-02-21 RX ADMIN — BACLOFEN 20 MG: 10 TABLET ORAL at 09:02

## 2020-02-21 RX ADMIN — BACLOFEN 20 MG: 10 TABLET ORAL at 12:02

## 2020-02-21 RX ADMIN — ENALAPRIL MALEATE 5 MG: 2.5 TABLET ORAL at 09:02

## 2020-02-21 RX ADMIN — FAMOTIDINE 20 MG: 20 TABLET ORAL at 09:02

## 2020-02-21 RX ADMIN — GABAPENTIN 1200 MG: 300 CAPSULE ORAL at 09:02

## 2020-02-21 RX ADMIN — COLLAGENASE SANTYL: 250 OINTMENT TOPICAL at 09:02

## 2020-02-21 RX ADMIN — VANCOMYCIN HYDROCHLORIDE 1500 MG: 1 INJECTION, POWDER, LYOPHILIZED, FOR SOLUTION INTRAVENOUS at 09:02

## 2020-02-21 RX ADMIN — FUROSEMIDE 40 MG: 40 TABLET ORAL at 09:02

## 2020-02-21 RX ADMIN — DULOXETINE 60 MG: 30 CAPSULE, DELAYED RELEASE ORAL at 09:02

## 2020-02-21 RX ADMIN — OXYBUTYNIN CHLORIDE 15 MG: 5 TABLET, EXTENDED RELEASE ORAL at 09:02

## 2020-02-21 RX ADMIN — DOXYCYCLINE HYCLATE 100 MG: 100 CAPSULE ORAL at 10:02

## 2020-02-21 NOTE — DISCHARGE INSTRUCTIONS
Pt has been advised to keep feet elevated to decrease the swelling which will help with healing process  Patient will need daily dressing with Santyl, gauze and kerlix to bilateral feet ulcers  Will need daily dressing to the burn on the right thigh with med honey  Advised patient for low-salt diet and hydration with 64 oz of water daily at least

## 2020-02-21 NOTE — PLAN OF CARE
Problem: Fall Injury Risk  Goal: Absence of Fall and Fall-Related Injury  Outcome: Ongoing, Progressing     Problem: Skin Injury Risk Increased  Goal: Skin Health and Integrity  Outcome: Ongoing, Progressing     Problem: Wound  Goal: Optimal Wound Healing  Outcome: Ongoing, Progressing

## 2020-02-21 NOTE — PLAN OF CARE
Received discharge orders from Dr. Pollard - return to [facility].  Called Vignesh ([facility]) and faxed discharge information.  Vignesh said she will have her DON review the information and call CM.      Called Vignesh again to find out if patient can return today.   stated she is away from her desk.  Left message requesting return call.

## 2020-02-21 NOTE — HOSPITAL COURSE
24-year-old  male with known past medical history of alcohol abuse, depression, insomnia and paraplegia admitted 02/18/2020 as a transfer from [facility] with primary complaint of nonhealing foot ulcers.  Patient informed he developed foot ulcers because he sleeps in his wheelchair and developed blisters on the foot in the ED is better in contact with the wheelchair.  Patient has previously seen doctors who had debridement done but they continued to get worse.  Patient also reported more swelling in the legs bilaterally. No tenderness or erythema.  In the emergency department patient CBC was unremarkable, CMP was unremarkable, patient was admitted to [facility].  Dr. Tidwell was consulted.  Patient was started on IV vancomycin, patient made NPO overnight for debridement in the morning on on 02/19/2020, wound Care was consulted.  Patient tolerated the procedure well and feeling better.  Had a long conversation with the patient and explained him that he needs to elevate his legs above the heart so the swelling improves and that will help with the healing of his wounds on the feet.  I explained to the patient that he cannot sleep in his wheelchair and he needs to laying in bed whenever he is watching TV with his feet elevated.  Also discussed low-salt diet and increasing water intake to 64 oz per day for proper hydration and healing process.  Patient verbalized understanding  Wound dressing and Dr. Tidwell recommended daily Santyl dressings to bilateral feet and med her knee dressing to the right thigh burn.    I personally discussed the case with Dr. Tidwell who recommended that patient needs to elevate his feet, needs daily dressing, and informed me that he has already spoke to Dr. High at [facility].  We also discussed that IV vancomycin can be changed to oral doxycycline upon discharge.  Dr. Tidwell agreed with patient transfer back to [facility] on 02/21/2020    Patient was seen and examined on the day of  discharge.  Vitals reviewed.  Constitutional: No distress. Laying in bed comfortably  HENT:  Normocephalic  Head: Atraumatic.  Moist mucous membranes  Cardiovascular: Normal rate, regular rhythm and normal heart sounds. B.L LE Pedal and pretibial edema which has since improved as compared to yesterday  Pulmonary/Chest: Effort normal. No wheezes.   Abdominal: Soft. Bowel sounds are normal. No distension and no mass. No tenderness  Neurological: Alert and oriented.  Paraplegic  Musculoskeletal  Skin:  Grade 2 ulcerations bilateral plantar feet with necrotic tissue full-thickness and necrotic fat

## 2020-02-21 NOTE — PROGRESS NOTES
[facility] Medicine    Progress Note    Patient Name: Jose Durán  MRN: [id number]  Patient Class: OP- Observation   Admission Date: 2/18/2020  7:12 PM  Length of Stay: 0  Attending Physician: NICHOLAS REYES MD  Primary Care Provider: Teofilo High Jr, MD  Face-to-Face encounter date: 02/20/2020  Chief Complaint: Leg Swelling (BILAT, X 1 MOS, DROPPED OFF BY MARTHA VAN) and SKIN SORES         Patient ID: Jose Durán is a 24 y.o. male admitted for   Active Hospital Problems    Diagnosis  POA    *Foot ulcer with fat layer exposed bilateral [L97.502]  Yes    Pressure injury of skin [L89.90]  Yes      Resolved Hospital Problems   No resolved problems to display.      HISTORY OF PRESENT ILLNESS by Dr Obando on admission 02/18/2020:   Jose Durán is a 24 y.o. Black or  male who  has a past medical history of Alcohol abuse, Depression, Insomnia, and Paraplegia.. The patient presented to [facility] on 2/18/2020 with a primary complaint of foot ulcers.     History was obtained from the patient and ER physician Sign-out. As per the patient, he developed foot ulcers because he sleeps in wheel chair and developed blisters on the foot in areas that are in contact with the wheel chair. He has seen a doctor who did debridement but they continue to get worst. Along with that he also reports more swelling on the legs bilaterally. No tenderness or erythema noted.     In the emergency room, patient CBC was unremarkable. CMP was unremarkable. Decision to admit was taken and patient was informed       Subjective:    Interval History: Patient is doing well. Laying in bed, patient is status post debridement bilateral feet ulcers yesterday by Dr. Tidwell.  Reports he is happy with the swelling that is going down on his feet and legs.  Denies pain.  No Family present at bedside.  No concerns/issues overnight reported by the patient or the nursing staff.    I personally discussed the  case with Dr. Tidwell who recommended that patient can be discharged tomorrow on oral doxycycline and can continue his daily dressings with Santyl and to wear pressure reports to take the pressure off of his wounds for healing at [facility] upon discharge. Informed that he has already spoke with Dr. burton at [facility]    Review of Systems All other Review of Systems were found to be negative expect for that mentioned already in HPI.     Objective:     Physical Exam  Vitals:    02/20/20 1600   BP: (!) 100/54   Pulse: 104   Resp: 18   Temp: 98.5 °F (36.9 °C)     Wt Readings from Last 1 Encounters:   02/19/20 99.1 kg (218 lb 7.6 oz)      Body mass index is 33.22 kg/m².    Vitals reviewed.  Constitutional: No distress. Laying in bed comfortably  HENT:  Normocephalic  Head: Atraumatic.  Moist mucous membranes  Cardiovascular: Normal rate, regular rhythm and normal heart sounds. B.L LE Pedal and pretibial edema which has since improved as compared to yesterday  Pulmonary/Chest: Effort normal. No wheezes.   Abdominal: Soft. Bowel sounds are normal. No distension and no mass. No tenderness  Neurological: Alert and oriented.  Paraplegic  Musculoskeletal  Skin:  Grade 2 ulcerations bilateral plantar feet with necrotic tissue full-thickness and necrotic fat    Following labs were Reviewed   CBC:  Recent Labs   Lab 02/20/20  0517   WBC 7.97   HGB 12.6*   HCT 40.2        CMP:  Recent Labs   Lab 02/20/20  0516   CALCIUM 8.7      K 4.2   CO2 27      BUN 8   CREATININE 0.7     Last 72 hour POCT GLUCOSE  No results found for: POCTGLUCOSE     Microbiology Results (last 7 days)     Procedure Component Value Units Date/Time    Blood Culture #1 **CANNOT BE ORDERED STAT** [[id number]] Collected:  02/18/20 1719    Order Status:  Completed Specimen:  Blood from Peripheral, Antecubital, Right Updated:  02/19/20 2232     Blood Culture, Routine No Growth to date      No Growth to date    Blood Culture #1 **CANNOT BE ORDERED  STAT** [[id number]]     Order Status:  No result Specimen:  Blood             US Lower Extremity Veins Bilateral   Final Result      Negative for DVT throughout bilateral lower extremities.         Electronically signed by: Pop Perez MD   Date:    02/18/2020   Time:    19:25            Scheduled Meds:   amitriptyline  100 mg Oral TID    baclofen  20 mg Oral QID    collagenase   Topical (Top) Daily    DULoxetine  60 mg Oral BID    enalapril  5 mg Oral Daily    famotidine  20 mg Oral BID    furosemide  40 mg Oral Daily    gabapentin  1,200 mg Oral TID    magnesium oxide  400 mg Oral Daily    oxybutynin  15 mg Oral Daily    rivaroxaban  10 mg Oral Daily with dinner    vancomycin (VANCOCIN) IVPB  1,500 mg Intravenous Q12H     Continuous Infusions:  PRN Meds:.acetaminophen, bisacodyL, calcium carbonate, cloNIDine, dextrose 50%, dextrose 50%, diphenhydrAMINE, glucagon (human recombinant), glucose, glucose, HYDROcodone-acetaminophen, HYDROmorphone, melatonin, ondansetron, ondansetron, oxyCODONE, promethazine (PHENERGAN) IVPB, sodium chloride 0.9%, sodium chloride 0.9%, Pharmacy to dose Vancomycin consult **AND** vancomycin - pharmacy to dose    Per wound care note 02/20/2020.  1.  Ulceration plantar right foot  2.  ulceration plantar left foot  3.  Burn anterior right thigh    Assessment & Plan:     Grade 2 ulcerations bilateral plantar feet with necrotic tissue full-thickness and necrotic fat:   admit to Med-surg.   Dr. Tidwell consulted.   Patient is status post excisional full-thickness debridement of necrotic tissue and subcutaneous tissue and fat plantar of the left foot and right foot under general anesthesia  Continue IV Vancomycin, pharmacy consulted for dosing, will switch to oral doxycycline tomorrow upon discharge to [place]  Counseled to sleep in bed with feet elevated and not in wheel chair.   Patient to wear the pressure boots  Consulted wound care, patient need Santyl daily dressings  Patient  has a history of multiple ulceration to both feet which has been treated periodically with debridement and wound care but failed to heal due to lower extremity edema    Lower extremity edema  Advised patient to elevate the legs above the heart level  Advised against sitting in wheelchair in sleeping in wheelchair  Discussed low-salt diet and proper hydration with 64 also water per day  Swelling improved    DVT Prophylaxis: will be placed on home oral DOAC for DVT prophylaxis and will be advised to be as mobile as possible and sit in a chair as tolerated.     Discharge Planning:   Possible discharge to [facility] tomorrow on oral antibiotic doxycycline.  Discussed with Dr. Tidwell    Above encounter included review of the medical records, interviewing and examining the patient face-to-face, discussion with family and other health care providers, ordering and interpreting lab/test results and formulating a plan of care.     Medical Decision Making:      [_] Low Complexity  [_] Moderate Complexity  [x] High Complexity      Christopher Pollard MD  Department of Hospital Medicine   [facility]

## 2020-02-21 NOTE — NURSING
Pt DCed to [facility]. Instructions & teaching given. Pt verbalizes understanding.   Pt left w/ aimee transportation.

## 2020-02-21 NOTE — DISCHARGE SUMMARY
[facility] Medicine  Discharge Summary      Patient Name: Jose Durán  MRN: [id number]  Admission Date: 2/18/2020  Hospital Length of Stay: 0 days  Discharge Date and Time:  02/21/2020 10:04 AM  Attending Physician: Christopher Pollard MD   Discharging Provider: Christopher Pollard MD  Primary Care Provider: Teofilo High Jr, MD      HISTORY OF PRESENT ILLNESS by Dr Obando on admission 02/18/2020:   Jose Durán is a 24 y.o. Black or  male who  has a past medical history of Alcohol abuse, Depression, Insomnia, and Paraplegia.. [facility] with a primary complaint of foot ulcers.     History was obtained from the patient and ER physician Sign-out. As per the patient, he developed foot ulcers because he sleeps in wheel chair and developed blisters on the foot in areas that are in contact with the wheel chair. He has seen a doctor who did debridement but they continue to get worst. Along with that he also reports more swelling on the legs bilaterally. No tenderness or erythema noted.     In the emergency room, patient CBC was unremarkable. CMP was unremarkable. Decision to admit was taken and patient was informed     Procedure(s) (LRB):  DEBRIDEMENT, FOOT  (Bilateral)      Hospital Course:   24-year-old  male with known past medical history of alcohol abuse, depression, insomnia and paraplegia admitted 02/18/2020 as a transfer from [place] with primary complaint of nonhealing foot ulcers.  Patient informed he developed foot ulcers because he sleeps in his wheelchair and developed blisters on the foot in the ED is better in contact with the wheelchair.  Patient has previously seen doctors who had debridement done but they continued to get worse.  Patient also reported more swelling in the legs bilaterally. No tenderness or erythema.  In the emergency department patient CBC was unremarkable, CMP was unremarkable, patient was  admitted to [facility].  Dr. Tidwell was consulted.  Patient was started on IV vancomycin, patient made NPO overnight for debridement in the morning on on 02/19/2020, wound Care was consulted.  Patient tolerated the procedure well and feeling better.  Had a long conversation with the patient and explained him that he needs to elevate his legs above the heart so the swelling improves and that will help with the healing of his wounds on the feet.  I explained to the patient that he cannot sleep in his wheelchair and he needs to laying in bed whenever he is watching TV with his feet elevated.  Also discussed low-salt diet and increasing water intake to 64 oz per day for proper hydration and healing process.  Patient verbalized understanding  Wound dressing and Dr. Tidwell recommended daily Santyl dressings to bilateral feet and med her knee dressing to the right thigh burn.    I personally discussed the case with Dr. Tidwell who recommended that patient needs to elevate his feet, needs daily dressing, and informed me that he has already spoke to Dr. High at [place].  We also discussed that IV vancomycin can be changed to oral doxycycline upon discharge.  Dr. Tidwell agreed with patient transfer back to [place] on 02/21/2020    Patient was seen and examined on the day of discharge.  Vitals reviewed.  Constitutional: No distress. Laying in bed comfortably  HENT:  Normocephalic  Head: Atraumatic.  Moist mucous membranes  Cardiovascular: Normal rate, regular rhythm and normal heart sounds. B.L LE Pedal and pretibial edema which has since improved as compared to yesterday  Pulmonary/Chest: Effort normal. No wheezes.   Abdominal: Soft. Bowel sounds are normal. No distension and no mass. No tenderness  Neurological: Alert and oriented.  Paraplegic  Musculoskeletal  Skin:  Grade 2 ulcerations bilateral plantar feet with necrotic tissue full-thickness and necrotic fat     Consults:   Consults (From admission, onward)         Status Ordering Provider     Inpatient consult to Hospitalist  Once     Provider:  Brooks Szymanski MD    Acknowledged JON SMITH     Inpatient consult to Podiatry  Once     Provider:  Emigdio Tidwell DPM    Completed BROOKS SZYMANSKI          No new Assessment & Plan notes have been filed under this hospital service since the last note was generated.  Service: Hospital Medicine    Final Active Diagnoses:    Diagnosis Date Noted POA    PRINCIPAL PROBLEM:  Foot ulcer with fat layer exposed bilateral [L97.502] 02/18/2020 Yes    Pressure injury of skin [L89.90] 02/18/2020 Yes    Bilateral lower extremity edema [R60.0] 02/21/2020 Yes      Problems Resolved During this Admission:       Discharged Condition: good    Disposition: Another Health Care Inst*    Follow Up:  Follow-up Information     Go to Emigdio Tidwell DPM.    Specialties:  Podiatry, Surgery  Why:  Appointment as scheduled  Contact information:  [address] [address]  [phone]                 Patient Instructions:      Diet Adult Regular     Notify your health care provider if you experience any of the following:  temperature >100.4     Change dressing (specify)   Order Comments: Dressing change: daily with Jose M, gauze and Kerlix; Patient to wear the pressure boots  To apply medihoney, mepilex, small bandaid to the burn on the right anterior thigh daily     Activity as tolerated       Significant Diagnostic Studies: Labs:   BMP:   Recent Labs   Lab 02/20/20  0516 02/21/20  0452   GLU 97 88    137   K 4.2 3.8    100   CO2 27 28   BUN 8 10   CREATININE 0.7 0.9   CALCIUM 8.7 9.2   MG 1.8 1.8   , CBC   Recent Labs   Lab 02/20/20  0517 02/21/20  0452   WBC 7.97 8.57   HGB 12.6* 13.0*   HCT 40.2 42.5    225   , Lipid Panel   Lab Results   Component Value Date    CHOL 149 02/19/2020    HDL 43 02/19/2020    LDLCALC 91.4 02/19/2020    TRIG 73 02/19/2020    CHOLHDL 28.9 02/19/2020    and  Troponin No results for input(s): TROPONINI in the last 168 hours.    Pending Diagnostic Studies:     None         Medications:  Reconciled Home Medications:      Medication List      START taking these medications    collagenase ointment  Commonly known as:  SANTYL  Apply topically once daily.  Start taking on:  February 22, 2020     doxycycline 100 MG Cap  Commonly known as:  VIBRAMYCIN  Take 1 capsule (100 mg total) by mouth every 12 (twelve) hours.        CONTINUE taking these medications    acetaminophen 325 MG tablet  Commonly known as:  TYLENOL  Take 650 mg by mouth every 6 (six) hours as needed for Pain.     amitriptyline 100 MG tablet  Commonly known as:  ELAVIL  Take 100 mg by mouth 3 (three) times daily.     baclofen 20 MG tablet  Commonly known as:  LIORESAL  Take 20 mg by mouth 4 (four) times daily.     bisacodyL 5 mg EC tablet  Commonly known as:  DULCOLAX  Take 10 mg by mouth nightly as needed for Constipation.     calcium carbonate 200 mg calcium (500 mg) chewable tablet  Commonly known as:  TUMS  Take 2 tablets by mouth 4 (four) times daily as needed for Heartburn.     cloNIDine 0.1 MG tablet  Commonly known as:  CATAPRES  Take 0.1 mg by mouth 4 (four) times daily as needed (SBP >160).     Disposable Enema 19-7 gram/118 mL Enem  Generic drug:  sodium phosphates  Place 1 enema rectally once. prn     DULoxetine 60 MG capsule  Commonly known as:  CYMBALTA  Take 60 mg by mouth 2 (two) times daily.     enalapril 5 MG tablet  Commonly known as:  VASOTEC  Take 5 mg by mouth once daily.     famotidine 20 MG tablet  Commonly known as:  PEPCID  Take 20 mg by mouth 2 (two) times daily.     furosemide 40 MG tablet  Commonly known as:  LASIX  Take 40 mg by mouth once daily.     gabapentin 600 MG tablet  Commonly known as:  NEURONTIN  Take 1,200 mg by mouth 3 (three) times daily.     magnesium oxide 400 mg (241.3 mg magnesium) tablet  Commonly known as:  MAG-OX  Take 400 mg by mouth once daily.      melatonin  Commonly known as:  MELATIN  Take 2 tablets by mouth nightly as needed.     mupirocin 2 % ointment  Commonly known as:  BACTROBAN  1 g by Nasal route every evening.     NON FORMULARY MEDICATION  Take 30 mLs by mouth once daily. PROSTAT     oxybutynin 15 MG Tr24  Commonly known as:  DITROPAN XL  Take 15 mg by mouth once daily.     polyethylene glycol 17 gram Pwpk  Commonly known as:  GLYCOLAX  Take 17 g by mouth once daily.     Xarelto 10 mg Tab  Generic drug:  rivaroxaban  Take 10 mg by mouth daily with dinner or evening meal.            Indwelling Lines/Drains at time of discharge:   Lines/Drains/Airways     None                 Time spent on the discharge of patient: 30 minutes  Patient was seen and examined on the date of discharge and determined to be suitable for discharge.         Christopher Pollard MD  Department of Hospital Medicine  Formerly [facility], [facility]

## 2020-02-21 NOTE — NURSING
BLANCA:  Report received from SHEFALI Mendoza. Care assumed.    2058  Assessment completed. Patient medicated per MAR with scheduled nightly medications. Patient is sitting in his wheelchair watching tv. Patient stated he will be getting back in bed soon. Vancomycin hung at this time and patient has no needs at this time. Cuca.

## 2020-02-23 LAB — BACTERIA BLD CULT: NORMAL

## 2020-04-09 ENCOUNTER — APPOINTMENT (OUTPATIENT)
Dept: LAB | Facility: HOSPITAL | Age: 25
End: 2020-04-09
Attending: FAMILY MEDICINE
Payer: MEDICAID

## 2020-04-09 DIAGNOSIS — L89.899: Primary | ICD-10-CM

## 2020-04-09 PROCEDURE — 87186 SC STD MICRODIL/AGAR DIL: CPT

## 2020-04-09 PROCEDURE — 87070 CULTURE OTHR SPECIMN AEROBIC: CPT

## 2020-04-09 PROCEDURE — 87077 CULTURE AEROBIC IDENTIFY: CPT

## 2020-04-13 LAB — BACTERIA SPEC AEROBE CULT: ABNORMAL

## 2020-04-26 ENCOUNTER — APPOINTMENT (OUTPATIENT)
Dept: LAB | Facility: HOSPITAL | Age: 25
End: 2020-04-26
Attending: FAMILY MEDICINE
Payer: MEDICAID

## 2020-04-26 DIAGNOSIS — N31.8 OTHER NEUROMUSCULAR DYSFUNCTION OF BLADDER: ICD-10-CM

## 2020-04-26 DIAGNOSIS — R30.0 DYSURIA: Primary | ICD-10-CM

## 2020-04-26 DIAGNOSIS — I95.9 HYPOTENSION: ICD-10-CM

## 2020-04-26 DIAGNOSIS — S24.104S: ICD-10-CM

## 2020-04-26 DIAGNOSIS — F33.9 MAJOR DEPRESSIVE DISORDER, RECURRENT: ICD-10-CM

## 2020-04-26 LAB
AMORPH CRY URNS QL MICRO: ABNORMAL
BACTERIA #/AREA URNS HPF: ABNORMAL /HPF
BILIRUB UR QL STRIP: ABNORMAL
CLARITY UR: ABNORMAL
COLOR UR: YELLOW
GLUCOSE UR QL STRIP: NEGATIVE
HGB UR QL STRIP: NEGATIVE
HYALINE CASTS #/AREA URNS LPF: 0 /LPF
KETONES UR QL STRIP: NEGATIVE
LEUKOCYTE ESTERASE UR QL STRIP: ABNORMAL
MICROSCOPIC COMMENT: ABNORMAL
NITRITE UR QL STRIP: NEGATIVE
PH UR STRIP: >8 [PH] (ref 5–8)
PROT UR QL STRIP: ABNORMAL
RBC #/AREA URNS HPF: 0 /HPF (ref 0–4)
SP GR UR STRIP: 1 (ref 1–1.03)
SQUAMOUS #/AREA URNS HPF: 8 /HPF
TRI-PHOS CRY URNS QL MICRO: ABNORMAL
URN SPEC COLLECT METH UR: ABNORMAL
UROBILINOGEN UR STRIP-ACNC: NEGATIVE EU/DL
WBC #/AREA URNS HPF: >100 /HPF (ref 0–5)

## 2020-04-26 PROCEDURE — 81000 URINALYSIS NONAUTO W/SCOPE: CPT

## 2020-04-26 PROCEDURE — 87088 URINE BACTERIA CULTURE: CPT

## 2020-04-26 PROCEDURE — 87077 CULTURE AEROBIC IDENTIFY: CPT

## 2020-04-26 PROCEDURE — 87086 URINE CULTURE/COLONY COUNT: CPT

## 2020-04-26 PROCEDURE — 87186 SC STD MICRODIL/AGAR DIL: CPT

## 2020-04-29 LAB — BACTERIA UR CULT: ABNORMAL

## 2020-08-04 ENCOUNTER — LAB VISIT (OUTPATIENT)
Dept: LAB | Facility: HOSPITAL | Age: 25
End: 2020-08-04
Attending: FAMILY MEDICINE
Payer: MEDICAID

## 2020-08-04 DIAGNOSIS — R30.0 DYSURIA: Primary | ICD-10-CM

## 2020-08-04 LAB
BACTERIA #/AREA URNS HPF: ABNORMAL /HPF
BILIRUB UR QL STRIP: NEGATIVE
CLARITY UR: ABNORMAL
COLOR UR: YELLOW
GLUCOSE UR QL STRIP: NEGATIVE
HGB UR QL STRIP: ABNORMAL
KETONES UR QL STRIP: NEGATIVE
LEUKOCYTE ESTERASE UR QL STRIP: ABNORMAL
MICROSCOPIC COMMENT: ABNORMAL
NITRITE UR QL STRIP: NEGATIVE
PH UR STRIP: 6 [PH] (ref 5–8)
PROT UR QL STRIP: NEGATIVE
RBC #/AREA URNS HPF: 3 /HPF (ref 0–4)
SP GR UR STRIP: 1.02 (ref 1–1.03)
SQUAMOUS #/AREA URNS HPF: 1 /HPF
URN SPEC COLLECT METH UR: ABNORMAL
UROBILINOGEN UR STRIP-ACNC: NEGATIVE EU/DL
WBC #/AREA URNS HPF: >100 /HPF (ref 0–5)

## 2020-08-04 PROCEDURE — 87086 URINE CULTURE/COLONY COUNT: CPT

## 2020-08-04 PROCEDURE — 87077 CULTURE AEROBIC IDENTIFY: CPT

## 2020-08-04 PROCEDURE — 87088 URINE BACTERIA CULTURE: CPT

## 2020-08-04 PROCEDURE — 87186 SC STD MICRODIL/AGAR DIL: CPT

## 2020-08-04 PROCEDURE — 81000 URINALYSIS NONAUTO W/SCOPE: CPT

## 2020-08-08 LAB — BACTERIA UR CULT: ABNORMAL

## 2020-08-31 ENCOUNTER — LAB VISIT (OUTPATIENT)
Dept: LAB | Facility: HOSPITAL | Age: 25
End: 2020-08-31
Attending: FAMILY MEDICINE
Payer: MEDICAID

## 2020-08-31 DIAGNOSIS — R30.0 DYSURIA: Primary | ICD-10-CM

## 2020-08-31 LAB
AMORPH CRY URNS QL MICRO: ABNORMAL
BACTERIA #/AREA URNS HPF: ABNORMAL /HPF
BILIRUB UR QL STRIP: NEGATIVE
CLARITY UR: ABNORMAL
COLOR UR: YELLOW
GLUCOSE UR QL STRIP: NEGATIVE
HGB UR QL STRIP: ABNORMAL
HYALINE CASTS #/AREA URNS LPF: ABNORMAL /LPF
KETONES UR QL STRIP: NEGATIVE
LEUKOCYTE ESTERASE UR QL STRIP: ABNORMAL
MICROSCOPIC COMMENT: ABNORMAL
NITRITE UR QL STRIP: NEGATIVE
PH UR STRIP: 8 [PH] (ref 5–8)
PROT UR QL STRIP: ABNORMAL
RBC #/AREA URNS HPF: ABNORMAL /HPF (ref 0–4)
SP GR UR STRIP: 1.02 (ref 1–1.03)
URN SPEC COLLECT METH UR: ABNORMAL
UROBILINOGEN UR STRIP-ACNC: NEGATIVE EU/DL
WBC #/AREA URNS HPF: >100 /HPF (ref 0–5)

## 2020-08-31 PROCEDURE — 87077 CULTURE AEROBIC IDENTIFY: CPT

## 2020-08-31 PROCEDURE — 87088 URINE BACTERIA CULTURE: CPT

## 2020-08-31 PROCEDURE — 87186 SC STD MICRODIL/AGAR DIL: CPT

## 2020-08-31 PROCEDURE — 87086 URINE CULTURE/COLONY COUNT: CPT

## 2020-08-31 PROCEDURE — 81000 URINALYSIS NONAUTO W/SCOPE: CPT

## 2020-09-03 LAB — BACTERIA UR CULT: ABNORMAL

## 2020-09-10 ENCOUNTER — LAB VISIT (OUTPATIENT)
Dept: LAB | Facility: HOSPITAL | Age: 25
End: 2020-09-10
Attending: FAMILY MEDICINE
Payer: MEDICAID

## 2020-09-10 DIAGNOSIS — U07.1 COVID-19: Primary | ICD-10-CM

## 2020-09-10 LAB — 25(OH)D3+25(OH)D2 SERPL-MCNC: 35 NG/ML (ref 30–96)

## 2020-09-10 PROCEDURE — 36415 COLL VENOUS BLD VENIPUNCTURE: CPT

## 2020-09-10 PROCEDURE — 82306 VITAMIN D 25 HYDROXY: CPT

## 2020-09-26 ENCOUNTER — APPOINTMENT (OUTPATIENT)
Dept: LAB | Facility: HOSPITAL | Age: 25
End: 2020-09-26
Attending: FAMILY MEDICINE
Payer: MEDICAID

## 2020-09-26 DIAGNOSIS — R30.0 DYSURIA: Primary | ICD-10-CM

## 2020-09-26 PROCEDURE — 87088 URINE BACTERIA CULTURE: CPT

## 2020-09-26 PROCEDURE — 87086 URINE CULTURE/COLONY COUNT: CPT

## 2020-09-26 PROCEDURE — 87077 CULTURE AEROBIC IDENTIFY: CPT

## 2020-09-26 PROCEDURE — 87186 SC STD MICRODIL/AGAR DIL: CPT

## 2020-09-29 LAB — BACTERIA UR CULT: ABNORMAL

## 2020-10-19 ENCOUNTER — APPOINTMENT (OUTPATIENT)
Dept: LAB | Facility: HOSPITAL | Age: 25
End: 2020-10-19
Attending: FAMILY MEDICINE
Payer: MEDICAID

## 2020-10-19 DIAGNOSIS — R30.0 DYSURIA: Primary | ICD-10-CM

## 2020-10-19 LAB
BACTERIA #/AREA URNS HPF: ABNORMAL /HPF
BILIRUB UR QL STRIP: NEGATIVE
CLARITY UR: ABNORMAL
COLOR UR: YELLOW
GLUCOSE UR QL STRIP: NEGATIVE
HGB UR QL STRIP: NEGATIVE
HYALINE CASTS #/AREA URNS LPF: 0 /LPF
KETONES UR QL STRIP: NEGATIVE
LEUKOCYTE ESTERASE UR QL STRIP: ABNORMAL
MICROSCOPIC COMMENT: ABNORMAL
NITRITE UR QL STRIP: NEGATIVE
PH UR STRIP: >8 [PH] (ref 5–8)
PROT UR QL STRIP: ABNORMAL
RBC #/AREA URNS HPF: 2 /HPF (ref 0–4)
SP GR UR STRIP: 1.01 (ref 1–1.03)
URN SPEC COLLECT METH UR: ABNORMAL
UROBILINOGEN UR STRIP-ACNC: NEGATIVE EU/DL
WBC #/AREA URNS HPF: 28 /HPF (ref 0–5)

## 2020-10-19 PROCEDURE — 87088 URINE BACTERIA CULTURE: CPT

## 2020-10-19 PROCEDURE — 87086 URINE CULTURE/COLONY COUNT: CPT

## 2020-10-19 PROCEDURE — 87186 SC STD MICRODIL/AGAR DIL: CPT

## 2020-10-19 PROCEDURE — 81000 URINALYSIS NONAUTO W/SCOPE: CPT

## 2020-10-19 PROCEDURE — 87077 CULTURE AEROBIC IDENTIFY: CPT

## 2020-10-21 LAB — BACTERIA UR CULT: ABNORMAL

## 2020-11-08 ENCOUNTER — APPOINTMENT (OUTPATIENT)
Dept: LAB | Facility: HOSPITAL | Age: 25
End: 2020-11-08
Attending: FAMILY MEDICINE
Payer: MEDICAID

## 2020-11-08 DIAGNOSIS — F33.9 MAJOR DEPRESSIVE DISORDER, RECURRENT: ICD-10-CM

## 2020-11-08 DIAGNOSIS — S24.104S: ICD-10-CM

## 2020-11-08 DIAGNOSIS — I95.89 OTHER HYPOTENSION: ICD-10-CM

## 2020-11-08 DIAGNOSIS — G82.21 PARAPLEGIA, COMPLETE: ICD-10-CM

## 2020-11-08 DIAGNOSIS — R30.0 DYSURIA: Primary | ICD-10-CM

## 2020-11-08 DIAGNOSIS — N31.8 OTHER NEUROMUSCULAR DYSFUNCTION OF BLADDER: ICD-10-CM

## 2020-11-08 LAB
BACTERIA #/AREA URNS HPF: ABNORMAL /HPF
BILIRUB UR QL STRIP: NEGATIVE
CLARITY UR: ABNORMAL
COLOR UR: YELLOW
GLUCOSE UR QL STRIP: NEGATIVE
HGB UR QL STRIP: NEGATIVE
KETONES UR QL STRIP: NEGATIVE
LEUKOCYTE ESTERASE UR QL STRIP: ABNORMAL
MICROSCOPIC COMMENT: ABNORMAL
NITRITE UR QL STRIP: NEGATIVE
PH UR STRIP: 7 [PH] (ref 5–8)
PROT UR QL STRIP: NEGATIVE
RBC #/AREA URNS HPF: 0 /HPF (ref 0–4)
SP GR UR STRIP: 1.02 (ref 1–1.03)
SQUAMOUS #/AREA URNS HPF: 1 /HPF
URN SPEC COLLECT METH UR: ABNORMAL
UROBILINOGEN UR STRIP-ACNC: NEGATIVE EU/DL
WBC #/AREA URNS HPF: >100 /HPF (ref 0–5)

## 2020-11-08 PROCEDURE — 87186 SC STD MICRODIL/AGAR DIL: CPT

## 2020-11-08 PROCEDURE — 87077 CULTURE AEROBIC IDENTIFY: CPT

## 2020-11-08 PROCEDURE — 87086 URINE CULTURE/COLONY COUNT: CPT

## 2020-11-08 PROCEDURE — 81000 URINALYSIS NONAUTO W/SCOPE: CPT

## 2020-11-08 PROCEDURE — 87088 URINE BACTERIA CULTURE: CPT

## 2020-11-11 LAB — BACTERIA UR CULT: ABNORMAL

## 2020-12-05 ENCOUNTER — LAB VISIT (OUTPATIENT)
Dept: LAB | Facility: HOSPITAL | Age: 25
End: 2020-12-05
Attending: FAMILY MEDICINE
Payer: MEDICAID

## 2020-12-05 DIAGNOSIS — R30.0 DYSURIA: Primary | ICD-10-CM

## 2020-12-05 LAB
BACTERIA #/AREA URNS HPF: ABNORMAL /HPF
BILIRUB UR QL STRIP: NEGATIVE
CLARITY UR: CLEAR
COLOR UR: YELLOW
GLUCOSE UR QL STRIP: NEGATIVE
HGB UR QL STRIP: NEGATIVE
KETONES UR QL STRIP: NEGATIVE
LEUKOCYTE ESTERASE UR QL STRIP: ABNORMAL
MICROSCOPIC COMMENT: ABNORMAL
NITRITE UR QL STRIP: POSITIVE
PH UR STRIP: >=9 [PH] (ref 5–8)
PROT UR QL STRIP: ABNORMAL
SP GR UR STRIP: 1.01 (ref 1–1.03)
URN SPEC COLLECT METH UR: ABNORMAL
UROBILINOGEN UR STRIP-ACNC: NEGATIVE EU/DL
WBC #/AREA URNS HPF: >100 /HPF (ref 0–5)

## 2020-12-05 PROCEDURE — 87086 URINE CULTURE/COLONY COUNT: CPT

## 2020-12-05 PROCEDURE — 87077 CULTURE AEROBIC IDENTIFY: CPT

## 2020-12-05 PROCEDURE — 81000 URINALYSIS NONAUTO W/SCOPE: CPT

## 2020-12-05 PROCEDURE — 87186 SC STD MICRODIL/AGAR DIL: CPT

## 2020-12-05 PROCEDURE — 87088 URINE BACTERIA CULTURE: CPT

## 2020-12-08 LAB — BACTERIA UR CULT: ABNORMAL

## 2021-02-17 ENCOUNTER — APPOINTMENT (OUTPATIENT)
Dept: LAB | Facility: HOSPITAL | Age: 26
End: 2021-02-17
Attending: FAMILY MEDICINE
Payer: MEDICARE

## 2021-02-17 DIAGNOSIS — F33.9 RECURRENT MAJOR DEPRESSION: ICD-10-CM

## 2021-02-17 DIAGNOSIS — N31.8 HYPERTONICITY OF BLADDER: ICD-10-CM

## 2021-02-17 DIAGNOSIS — S24.104S: ICD-10-CM

## 2021-02-17 DIAGNOSIS — G82.21 COMPLETE PARAPLEGIA: ICD-10-CM

## 2021-02-17 DIAGNOSIS — R41.82 AMS (ALTERED MENTAL STATUS): Primary | ICD-10-CM

## 2021-02-17 LAB
AMORPH CRY URNS QL MICRO: ABNORMAL
BACTERIA #/AREA URNS HPF: ABNORMAL /HPF
BILIRUB UR QL STRIP: NEGATIVE
CLARITY UR: ABNORMAL
COLOR UR: YELLOW
GLUCOSE UR QL STRIP: NEGATIVE
HGB UR QL STRIP: NEGATIVE
KETONES UR QL STRIP: NEGATIVE
LEUKOCYTE ESTERASE UR QL STRIP: ABNORMAL
MICROSCOPIC COMMENT: ABNORMAL
NITRITE UR QL STRIP: POSITIVE
PH UR STRIP: 8 [PH] (ref 5–8)
PROT UR QL STRIP: NEGATIVE
RBC #/AREA URNS HPF: 2 /HPF (ref 0–4)
SP GR UR STRIP: 1.02 (ref 1–1.03)
SQUAMOUS #/AREA URNS HPF: 4 /HPF
URATE CRY URNS QL MICRO: ABNORMAL
URN SPEC COLLECT METH UR: ABNORMAL
UROBILINOGEN UR STRIP-ACNC: 1 EU/DL
WBC #/AREA URNS HPF: 47 /HPF (ref 0–5)

## 2021-02-17 PROCEDURE — 87086 URINE CULTURE/COLONY COUNT: CPT

## 2021-02-17 PROCEDURE — 87077 CULTURE AEROBIC IDENTIFY: CPT

## 2021-02-17 PROCEDURE — 81000 URINALYSIS NONAUTO W/SCOPE: CPT

## 2021-02-17 PROCEDURE — 87186 SC STD MICRODIL/AGAR DIL: CPT

## 2021-02-17 PROCEDURE — 87088 URINE BACTERIA CULTURE: CPT

## 2021-02-20 LAB — BACTERIA UR CULT: ABNORMAL

## 2021-03-08 ENCOUNTER — APPOINTMENT (OUTPATIENT)
Dept: LAB | Facility: HOSPITAL | Age: 26
End: 2021-03-08
Attending: FAMILY MEDICINE
Payer: MEDICARE

## 2021-03-08 DIAGNOSIS — R30.0 DYSURIA: Primary | ICD-10-CM

## 2021-03-08 LAB
BACTERIA #/AREA URNS HPF: ABNORMAL /HPF
BILIRUB UR QL STRIP: NEGATIVE
CLARITY UR: ABNORMAL
COLOR UR: YELLOW
GLUCOSE UR QL STRIP: NEGATIVE
HGB UR QL STRIP: ABNORMAL
KETONES UR QL STRIP: NEGATIVE
LEUKOCYTE ESTERASE UR QL STRIP: ABNORMAL
MICROSCOPIC COMMENT: ABNORMAL
NITRITE UR QL STRIP: NEGATIVE
PH UR STRIP: 6 [PH] (ref 5–8)
PROT UR QL STRIP: ABNORMAL
RBC #/AREA URNS HPF: 25 /HPF (ref 0–4)
SP GR UR STRIP: 1.02 (ref 1–1.03)
URN SPEC COLLECT METH UR: ABNORMAL
UROBILINOGEN UR STRIP-ACNC: NEGATIVE EU/DL
WBC #/AREA URNS HPF: >100 /HPF (ref 0–5)

## 2021-03-08 PROCEDURE — 81000 URINALYSIS NONAUTO W/SCOPE: CPT | Performed by: FAMILY MEDICINE

## 2021-03-08 PROCEDURE — 87077 CULTURE AEROBIC IDENTIFY: CPT | Performed by: FAMILY MEDICINE

## 2021-03-08 PROCEDURE — 87086 URINE CULTURE/COLONY COUNT: CPT | Performed by: FAMILY MEDICINE

## 2021-03-08 PROCEDURE — 87088 URINE BACTERIA CULTURE: CPT | Performed by: FAMILY MEDICINE

## 2021-03-08 PROCEDURE — 87186 SC STD MICRODIL/AGAR DIL: CPT | Performed by: FAMILY MEDICINE

## 2021-03-11 LAB — BACTERIA UR CULT: ABNORMAL

## 2022-01-04 ENCOUNTER — APPOINTMENT (OUTPATIENT)
Dept: LAB | Facility: HOSPITAL | Age: 27
End: 2022-01-04
Attending: INTERNAL MEDICINE
Payer: MEDICARE

## 2022-01-04 DIAGNOSIS — N39.0 UTI (URINARY TRACT INFECTION): Primary | ICD-10-CM

## 2022-01-04 LAB
BACTERIA #/AREA URNS HPF: ABNORMAL /HPF
BILIRUB UR QL STRIP: NEGATIVE
CLARITY UR: CLEAR
COLOR UR: YELLOW
GLUCOSE UR QL STRIP: NEGATIVE
HGB UR QL STRIP: ABNORMAL
HYALINE CASTS #/AREA URNS LPF: 0 /LPF
KETONES UR QL STRIP: NEGATIVE
LEUKOCYTE ESTERASE UR QL STRIP: ABNORMAL
MICROSCOPIC COMMENT: ABNORMAL
NITRITE UR QL STRIP: NEGATIVE
PH UR STRIP: 7 [PH] (ref 5–8)
PROT UR QL STRIP: ABNORMAL
RBC #/AREA URNS HPF: 0 /HPF (ref 0–4)
SP GR UR STRIP: >=1.03 (ref 1–1.03)
URN SPEC COLLECT METH UR: ABNORMAL
UROBILINOGEN UR STRIP-ACNC: 1 EU/DL
WBC #/AREA URNS HPF: >100 /HPF (ref 0–5)

## 2022-01-04 PROCEDURE — 81000 URINALYSIS NONAUTO W/SCOPE: CPT | Performed by: INTERNAL MEDICINE

## (undated) DEVICE — SPONGE GAUZE 10S 4X4  442214

## (undated) DEVICE — DRESSING ADAPTIC 3X8 2015

## (undated) DEVICE — TUBE CULTURE AMIES 220117

## (undated) DEVICE — SOLUTION SCRUB IODINE 4OZ

## (undated) DEVICE — SOLUTION PREP IODINE 4OZ

## (undated) DEVICE — COVER LIGHT HANDLE LB53

## (undated) DEVICE — BANDAGE ACE STERILE 4 REB3114

## (undated) DEVICE — BANDAGE KERLIX   441106

## (undated) DEVICE — BANDAGE ESMARK 4X9 55514

## (undated) DEVICE — SOLUTION IRRI NS BOTTLE 1000ML R5200-01

## (undated) DEVICE — GLOVE BIOGEL PI   GOLD SIZE 8

## (undated) DEVICE — TRAY LOWER EXTREMITY  SMHS029-05

## (undated) DEVICE — CUFF TOURNIQUET 18DUAL PRT 5921-218-235

## (undated) DEVICE — PAD BOVIE ADULT

## (undated) DEVICE — TRAY SKIN PREP DRY

## (undated) DEVICE — DRESSING ADAPTIC 3X3 6112